# Patient Record
Sex: MALE | Race: WHITE | NOT HISPANIC OR LATINO | Employment: OTHER | URBAN - METROPOLITAN AREA
[De-identification: names, ages, dates, MRNs, and addresses within clinical notes are randomized per-mention and may not be internally consistent; named-entity substitution may affect disease eponyms.]

---

## 2017-06-29 ENCOUNTER — RESULTS ENCOUNTER (OUTPATIENT)
Dept: INTERNAL MEDICINE | Facility: CLINIC | Age: 72
End: 2017-06-29

## 2017-06-29 DIAGNOSIS — E78.49 OTHER HYPERLIPIDEMIA: Primary | ICD-10-CM

## 2017-06-29 DIAGNOSIS — R73.09 ABNORMAL GLUCOSE TOLERANCE TEST: ICD-10-CM

## 2017-06-29 DIAGNOSIS — Z00.00 ROUTINE GENERAL MEDICAL EXAMINATION AT A HEALTH CARE FACILITY: ICD-10-CM

## 2017-06-29 DIAGNOSIS — E78.49 OTHER HYPERLIPIDEMIA: ICD-10-CM

## 2017-06-29 DIAGNOSIS — M51.37 DDD (DEGENERATIVE DISC DISEASE), LUMBOSACRAL: ICD-10-CM

## 2017-06-29 DIAGNOSIS — N40.0 BENIGN FIBROMA OF PROSTATE: ICD-10-CM

## 2017-06-29 DIAGNOSIS — I10 ESSENTIAL HYPERTENSION: ICD-10-CM

## 2017-06-30 LAB
ALBUMIN SERPL-MCNC: 4.7 G/DL (ref 3.5–5.2)
ALBUMIN/GLOB SERPL: 1.7 G/DL
ALP SERPL-CCNC: 78 U/L (ref 39–117)
ALT SERPL-CCNC: 14 U/L (ref 1–41)
AST SERPL-CCNC: 18 U/L (ref 1–40)
BASOPHILS # BLD AUTO: 0.06 10*3/MM3 (ref 0–0.2)
BASOPHILS NFR BLD AUTO: 1 % (ref 0–1.5)
BILIRUB SERPL-MCNC: 0.2 MG/DL (ref 0.1–1.2)
BUN SERPL-MCNC: 20 MG/DL (ref 8–23)
BUN/CREAT SERPL: 18.3 (ref 7–25)
CALCIUM SERPL-MCNC: 9.8 MG/DL (ref 8.6–10.5)
CHLORIDE SERPL-SCNC: 99 MMOL/L (ref 98–107)
CHOLEST SERPL-MCNC: 189 MG/DL (ref 0–200)
CO2 SERPL-SCNC: 25.2 MMOL/L (ref 22–29)
CREAT SERPL-MCNC: 1.09 MG/DL (ref 0.76–1.27)
EOSINOPHIL # BLD AUTO: 0.15 10*3/MM3 (ref 0–0.7)
EOSINOPHIL NFR BLD AUTO: 2.6 % (ref 0.3–6.2)
ERYTHROCYTE [DISTWIDTH] IN BLOOD BY AUTOMATED COUNT: 13.2 % (ref 11.5–14.5)
GLOBULIN SER CALC-MCNC: 2.8 GM/DL
GLUCOSE SERPL-MCNC: 88 MG/DL (ref 65–99)
HCT VFR BLD AUTO: 41.1 % (ref 40.4–52.2)
HDLC SERPL-MCNC: 54 MG/DL (ref 40–60)
HGB BLD-MCNC: 14 G/DL (ref 13.7–17.6)
IMM GRANULOCYTES # BLD: 0.03 10*3/MM3 (ref 0–0.03)
IMM GRANULOCYTES NFR BLD: 0.5 % (ref 0–0.5)
LDLC SERPL CALC-MCNC: 119 MG/DL (ref 0–100)
LYMPHOCYTES # BLD AUTO: 2.38 10*3/MM3 (ref 0.9–4.8)
LYMPHOCYTES NFR BLD AUTO: 41.2 % (ref 19.6–45.3)
MCH RBC QN AUTO: 32.7 PG (ref 27–32.7)
MCHC RBC AUTO-ENTMCNC: 34.1 G/DL (ref 32.6–36.4)
MCV RBC AUTO: 96 FL (ref 79.8–96.2)
MONOCYTES # BLD AUTO: 0.47 10*3/MM3 (ref 0.2–1.2)
MONOCYTES NFR BLD AUTO: 8.1 % (ref 5–12)
NEUTROPHILS # BLD AUTO: 2.68 10*3/MM3 (ref 1.9–8.1)
NEUTROPHILS NFR BLD AUTO: 46.6 % (ref 42.7–76)
PLATELET # BLD AUTO: 313 10*3/MM3 (ref 140–500)
POTASSIUM SERPL-SCNC: 4.9 MMOL/L (ref 3.5–5.2)
PROT SERPL-MCNC: 7.5 G/DL (ref 6–8.5)
RBC # BLD AUTO: 4.28 10*6/MM3 (ref 4.6–6)
SODIUM SERPL-SCNC: 138 MMOL/L (ref 136–145)
T4 FREE SERPL-MCNC: 1.17 NG/DL (ref 0.93–1.7)
TRIGL SERPL-MCNC: 80 MG/DL (ref 0–150)
TSH SERPL DL<=0.005 MIU/L-ACNC: 4.13 MIU/ML (ref 0.27–4.2)
UNABLE TO VOID: NORMAL
VLDLC SERPL CALC-MCNC: 16 MG/DL (ref 5–40)
WBC # BLD AUTO: 5.77 10*3/MM3 (ref 4.5–10.7)

## 2017-07-04 ENCOUNTER — RESULTS ENCOUNTER (OUTPATIENT)
Dept: INTERNAL MEDICINE | Facility: CLINIC | Age: 72
End: 2017-07-04

## 2017-07-04 DIAGNOSIS — I10 ESSENTIAL HYPERTENSION: ICD-10-CM

## 2017-07-04 DIAGNOSIS — E78.49 OTHER HYPERLIPIDEMIA: ICD-10-CM

## 2017-07-04 DIAGNOSIS — R73.09 ABNORMAL GLUCOSE TOLERANCE TEST: ICD-10-CM

## 2017-07-04 DIAGNOSIS — M51.37 DDD (DEGENERATIVE DISC DISEASE), LUMBOSACRAL: ICD-10-CM

## 2017-07-04 DIAGNOSIS — Z00.00 ROUTINE GENERAL MEDICAL EXAMINATION AT A HEALTH CARE FACILITY: ICD-10-CM

## 2017-07-04 DIAGNOSIS — N40.0 BENIGN FIBROMA OF PROSTATE: ICD-10-CM

## 2017-07-05 RX ORDER — AMLODIPINE BESYLATE AND BENAZEPRIL HYDROCHLORIDE 10; 20 MG/1; MG/1
1 CAPSULE ORAL DAILY
Qty: 90 CAPSULE | Refills: 1 | Status: SHIPPED | OUTPATIENT
Start: 2017-07-05 | End: 2018-01-26

## 2017-07-05 RX ORDER — AMLODIPINE BESYLATE AND BENAZEPRIL HYDROCHLORIDE 10; 20 MG/1; MG/1
CAPSULE ORAL
Qty: 90 CAPSULE | Refills: 1 | Status: SHIPPED | OUTPATIENT
Start: 2017-07-05 | End: 2017-07-05 | Stop reason: SDUPTHER

## 2018-01-05 RX ORDER — AMLODIPINE BESYLATE AND BENAZEPRIL HYDROCHLORIDE 10; 20 MG/1; MG/1
CAPSULE ORAL
Qty: 90 CAPSULE | Refills: 0 | Status: SHIPPED | OUTPATIENT
Start: 2018-01-05 | End: 2018-09-24 | Stop reason: SDUPTHER

## 2018-01-26 ENCOUNTER — OFFICE VISIT (OUTPATIENT)
Dept: INTERNAL MEDICINE | Facility: CLINIC | Age: 73
End: 2018-01-26

## 2018-01-26 VITALS
OXYGEN SATURATION: 97 % | DIASTOLIC BLOOD PRESSURE: 72 MMHG | TEMPERATURE: 97.5 F | BODY MASS INDEX: 25.17 KG/M2 | HEIGHT: 72 IN | WEIGHT: 185.8 LBS | HEART RATE: 93 BPM | SYSTOLIC BLOOD PRESSURE: 130 MMHG

## 2018-01-26 DIAGNOSIS — I10 ESSENTIAL HYPERTENSION: Primary | ICD-10-CM

## 2018-01-26 PROCEDURE — 99212 OFFICE O/P EST SF 10 MIN: CPT | Performed by: INTERNAL MEDICINE

## 2018-02-08 NOTE — PROGRESS NOTES
Subjective   Chacorta Langston is a 72 y.o. male.   He is here today follow-up for hypertension which is stable  History of Present Illness   He is here today to follow-up for hypertension which is stable and he has no new complaints with current medication  The following portions of the patient's history were reviewed and updated as appropriate: allergies, current medications, past family history, past medical history, past social history, past surgical history and problem list.    Review of Systems   All other systems reviewed and are negative.      Objective   Physical Exam   Constitutional: He is oriented to person, place, and time. He appears well-developed and well-nourished. He is cooperative.   HENT:   Head: Normocephalic and atraumatic.   Right Ear: Hearing, tympanic membrane, external ear and ear canal normal.   Left Ear: Hearing, tympanic membrane, external ear and ear canal normal.   Nose: Nose normal.   Mouth/Throat: Uvula is midline, oropharynx is clear and moist and mucous membranes are normal.   Eyes: Conjunctivae, EOM and lids are normal. Pupils are equal, round, and reactive to light.   Neck: Phonation normal. Neck supple. Carotid bruit is not present.   Cardiovascular: Normal rate, regular rhythm and normal heart sounds.  Exam reveals no gallop and no friction rub.    No murmur heard.  Pulmonary/Chest: Effort normal and breath sounds normal. No respiratory distress.   Abdominal: Soft. Bowel sounds are normal. He exhibits no distension and no mass. There is no hepatosplenomegaly. There is no tenderness. There is no rebound and no guarding. No hernia.   Musculoskeletal: He exhibits no edema.   Neurological: He is alert and oriented to person, place, and time. Coordination and gait normal.   Skin: Skin is warm and dry.   Psychiatric: He has a normal mood and affect. His speech is normal and behavior is normal. Judgment and thought content normal.   Nursing note and vitals  reviewed.      Assessment/Plan   Diagnoses and all orders for this visit:    Essential hypertension      Hypertension stable on current medication no changes needed and we will continue with baby aspirin as well

## 2018-02-10 RX ORDER — PROMETHAZINE HYDROCHLORIDE 25 MG/1
TABLET ORAL
Qty: 50 TABLET | Refills: 0 | Status: SHIPPED | OUTPATIENT
Start: 2018-02-10 | End: 2018-12-11

## 2018-09-24 RX ORDER — AMLODIPINE BESYLATE AND BENAZEPRIL HYDROCHLORIDE 10; 20 MG/1; MG/1
CAPSULE ORAL
Qty: 90 CAPSULE | Refills: 0 | Status: SHIPPED | OUTPATIENT
Start: 2018-09-24 | End: 2018-12-20 | Stop reason: SDUPTHER

## 2018-12-05 ENCOUNTER — OFFICE (OUTPATIENT)
Dept: URBAN - METROPOLITAN AREA CLINIC 66 | Facility: CLINIC | Age: 73
End: 2018-12-05

## 2018-12-05 VITALS
DIASTOLIC BLOOD PRESSURE: 66 MMHG | HEIGHT: 72 IN | HEART RATE: 80 BPM | SYSTOLIC BLOOD PRESSURE: 108 MMHG | WEIGHT: 187 LBS

## 2018-12-05 DIAGNOSIS — R19.5 OTHER FECAL ABNORMALITIES: ICD-10-CM

## 2018-12-05 DIAGNOSIS — K92.1 MELENA: ICD-10-CM

## 2018-12-05 PROCEDURE — 99213 OFFICE O/P EST LOW 20 MIN: CPT | Performed by: INTERNAL MEDICINE

## 2018-12-05 RX ORDER — ESOMEPRAZOLE 20 MG/1
40 CAPSULE, DELAYED RELEASE ORAL
Qty: 60 | Refills: 12 | Status: ACTIVE
Start: 2018-12-05

## 2018-12-07 ENCOUNTER — ON CAMPUS - OUTPATIENT (OUTPATIENT)
Dept: URBAN - METROPOLITAN AREA HOSPITAL 114 | Facility: HOSPITAL | Age: 73
End: 2018-12-07
Payer: COMMERCIAL

## 2018-12-07 ENCOUNTER — ANESTHESIA EVENT (OUTPATIENT)
Dept: GASTROENTEROLOGY | Facility: HOSPITAL | Age: 73
End: 2018-12-07

## 2018-12-07 ENCOUNTER — ANESTHESIA (OUTPATIENT)
Dept: GASTROENTEROLOGY | Facility: HOSPITAL | Age: 73
End: 2018-12-07

## 2018-12-07 ENCOUNTER — HOSPITAL ENCOUNTER (OUTPATIENT)
Facility: HOSPITAL | Age: 73
Setting detail: HOSPITAL OUTPATIENT SURGERY
Discharge: HOME | End: 2018-12-07
Attending: INTERNAL MEDICINE | Admitting: INTERNAL MEDICINE

## 2018-12-07 VITALS
DIASTOLIC BLOOD PRESSURE: 70 MMHG | WEIGHT: 185.06 LBS | SYSTOLIC BLOOD PRESSURE: 124 MMHG | RESPIRATION RATE: 16 BRPM | HEART RATE: 78 BPM | TEMPERATURE: 97.9 F | BODY MASS INDEX: 25.09 KG/M2 | OXYGEN SATURATION: 98 %

## 2018-12-07 DIAGNOSIS — K92.1 MELENA: ICD-10-CM

## 2018-12-07 DIAGNOSIS — D62 ACUTE POSTHEMORRHAGIC ANEMIA: ICD-10-CM

## 2018-12-07 DIAGNOSIS — K29.50 UNSPECIFIED CHRONIC GASTRITIS WITHOUT BLEEDING: ICD-10-CM

## 2018-12-07 DIAGNOSIS — K25.4 CHRONIC OR UNSPECIFIED GASTRIC ULCER WITH HEMORRHAGE: ICD-10-CM

## 2018-12-07 DIAGNOSIS — K22.2 ESOPHAGEAL OBSTRUCTION: ICD-10-CM

## 2018-12-07 DIAGNOSIS — K44.9 DIAPHRAGMATIC HERNIA WITHOUT OBSTRUCTION OR GANGRENE: ICD-10-CM

## 2018-12-07 DIAGNOSIS — R19.5 HEME POSITIVE STOOL: ICD-10-CM

## 2018-12-07 DIAGNOSIS — D64.9 ANEMIA: ICD-10-CM

## 2018-12-07 DIAGNOSIS — R19.5 OTHER FECAL ABNORMALITIES: ICD-10-CM

## 2018-12-07 PROCEDURE — 43239 EGD BIOPSY SINGLE/MULTIPLE: CPT | Mod: 59 | Performed by: INTERNAL MEDICINE

## 2018-12-07 PROCEDURE — 25010000002 PROPOFOL 10 MG/ML EMULSION: Performed by: ANESTHESIOLOGY

## 2018-12-07 PROCEDURE — 25010000002 PHENYLEPHRINE PER 1 ML: Performed by: ANESTHESIOLOGY

## 2018-12-07 PROCEDURE — 43255 EGD CONTROL BLEEDING ANY: CPT | Performed by: INTERNAL MEDICINE

## 2018-12-07 PROCEDURE — 88305 TISSUE EXAM BY PATHOLOGIST: CPT | Performed by: INTERNAL MEDICINE

## 2018-12-07 DEVICE — DEV CLIP ENDO RESOLUTION360 CONTRL ROT 235CM: Type: IMPLANTABLE DEVICE | Site: STOMACH | Status: FUNCTIONAL

## 2018-12-07 RX ORDER — PROPOFOL 10 MG/ML
VIAL (ML) INTRAVENOUS CONTINUOUS PRN
Status: DISCONTINUED | OUTPATIENT
Start: 2018-12-07 | End: 2018-12-07 | Stop reason: SURG

## 2018-12-07 RX ORDER — SODIUM CHLORIDE, SODIUM LACTATE, POTASSIUM CHLORIDE, CALCIUM CHLORIDE 600; 310; 30; 20 MG/100ML; MG/100ML; MG/100ML; MG/100ML
1000 INJECTION, SOLUTION INTRAVENOUS CONTINUOUS
Status: DISCONTINUED | OUTPATIENT
Start: 2018-12-07 | End: 2018-12-07 | Stop reason: HOSPADM

## 2018-12-07 RX ORDER — PROPOFOL 10 MG/ML
VIAL (ML) INTRAVENOUS AS NEEDED
Status: DISCONTINUED | OUTPATIENT
Start: 2018-12-07 | End: 2018-12-07 | Stop reason: SURG

## 2018-12-07 RX ORDER — LIDOCAINE HYDROCHLORIDE 20 MG/ML
INJECTION, SOLUTION INFILTRATION; PERINEURAL AS NEEDED
Status: DISCONTINUED | OUTPATIENT
Start: 2018-12-07 | End: 2018-12-07 | Stop reason: SURG

## 2018-12-07 RX ADMIN — SODIUM CHLORIDE, POTASSIUM CHLORIDE, SODIUM LACTATE AND CALCIUM CHLORIDE 1000 ML: 600; 310; 30; 20 INJECTION, SOLUTION INTRAVENOUS at 10:57

## 2018-12-07 RX ADMIN — PROPOFOL 100 MCG/KG/MIN: 10 INJECTION, EMULSION INTRAVENOUS at 11:09

## 2018-12-07 RX ADMIN — PHENYLEPHRINE HYDROCHLORIDE 100 MCG: 10 INJECTION INTRAVENOUS at 11:27

## 2018-12-07 RX ADMIN — PROPOFOL 100 MG: 10 INJECTION, EMULSION INTRAVENOUS at 11:11

## 2018-12-07 RX ADMIN — PHENYLEPHRINE HYDROCHLORIDE 200 MCG: 10 INJECTION INTRAVENOUS at 11:40

## 2018-12-07 RX ADMIN — EPHEDRINE SULFATE 25 MG: 50 INJECTION INTRAMUSCULAR; INTRAVENOUS; SUBCUTANEOUS at 11:18

## 2018-12-07 RX ADMIN — LIDOCAINE HYDROCHLORIDE 100 MG: 20 INJECTION, SOLUTION INFILTRATION; PERINEURAL at 11:11

## 2018-12-07 RX ADMIN — SODIUM CHLORIDE, POTASSIUM CHLORIDE, SODIUM LACTATE AND CALCIUM CHLORIDE: 600; 310; 30; 20 INJECTION, SOLUTION INTRAVENOUS at 11:05

## 2018-12-07 NOTE — ANESTHESIA POSTPROCEDURE EVALUATION
Patient: Chacorta Langston    Procedure Summary     Date:  12/07/18 Room / Location:   [facility]    Anesthesia Start:  1105 Anesthesia Stop:  1145    Procedures:       ESOPHAGOGASTRODUODENOSCOPY with biopsies, resolution clip X 1 placed (N/A Esophagus)      COLONOSCOPY to cecum and terminal ileum (N/A ) Diagnosis:      Surgeon:  Maxx Tavarez MD Provider:  Tramaine Verma MD    Anesthesia Type:  MAC ASA Status:  2          Anesthesia Type: MAC  Last vitals  BP   106/53 (12/07/18 1045)   Temp   36.8 °C (98.3 °F) (12/07/18 1045)   Pulse   74 (12/07/18 1045)   Resp   18 (12/07/18 1045)     SpO2   98 % (12/07/18 1045)     Post Anesthesia Care and Evaluation    Patient location during evaluation: bedside  Patient participation: complete - patient participated  Level of consciousness: sleepy but conscious  Pain score: 0  Pain management: adequate  Airway patency: patent  Anesthetic complications: No anesthetic complications    Cardiovascular status: acceptable  Respiratory status: acceptable  Hydration status: acceptable    Comments: /53 (BP Location: Left arm, Patient Position: Lying)   Pulse 74   Temp 36.8 °C (98.3 °F) (Oral)   Resp 18   Wt 83.9 kg (185 lb 1 oz)   SpO2 98%   BMI 25.09 kg/m²

## 2018-12-07 NOTE — H&P
Patient Care Team:  Vikram Grace MD as PCP - General (Internal Medicine)  Vikram Grace MD as PCP - Claims Attributed    Chief complaint melena    Subjective     History of Present Illness  Recently presented with black stools  Found to have black heme + stool in the office and a large prostate,   hgb 11.8 concern over ulcer disease   Review of Systems   Gastrointestinal: Positive for blood in stool.        Past Medical History:   Diagnosis Date   • Hypertension      Past Surgical History:   Procedure Laterality Date   • COLONOSCOPY       Family History   Problem Relation Age of Onset   • COPD Other    • Heart failure Other    • Diabetes Other    • Stroke Other    • Hypertension Other      Social History     Tobacco Use   • Smoking status: Never Smoker   Substance Use Topics   • Alcohol use: Yes   • Drug use: Not on file     Medications Prior to Admission   Medication Sig Dispense Refill Last Dose   • amLODIPine-benazepril (LOTREL) 10-20 MG per capsule TAKE ONE CAPSULE BY MOUTH DAILY 90 capsule 0 12/7/2018 at Unknown time   • Biotin 5000 MCG tablet Take  by mouth.   Past Week at Unknown time   • calcium carbonate-vitamin d 600-400 MG-UNIT per tablet Take  by mouth.   Past Week at Unknown time   • desonide (DESOWEN) 0.05 % ointment Apply  topically 2 (two) times a day.   Past Week at Unknown time   • Flaxseed, Linseed, (FLAX SEEDS PO) Take  by mouth.   Past Week at Unknown time   • saw palmetto 160 MG capsule Take  by mouth 2 (two) times a day.   Past Week at Unknown time   • vitamin C (ASCORBIC ACID) 250 MG tablet Take  by mouth daily.   Past Week at Unknown time   • aspirin 81 MG tablet Take  by mouth daily.   12/4/2018   • promethazine (PHENERGAN) 25 MG tablet Take 1 or 2 tabs christina 4 hours as needed 50 tablet 0      Allergies:  Beta adrenergic blockers and Penicillins    Objective      Vital Signs  Temp:  [98.3 °F (36.8 °C)] 98.3 °F (36.8 °C)  Heart Rate:  [74] 74  Resp:  [18] 18  BP:  (106)/(53) 106/53    Physical Exam   Constitutional: He is oriented to person, place, and time. He appears well-developed and well-nourished.   HENT:   Head: Normocephalic.   Eyes: Conjunctivae are normal.   Neck: Normal range of motion. Neck supple.   Cardiovascular: Normal rate and regular rhythm.   Pulmonary/Chest: Effort normal and breath sounds normal.   Abdominal: Soft. Bowel sounds are normal.   Neurological: He is alert and oriented to person, place, and time.   Skin: Skin is warm and dry.   Psychiatric: He has a normal mood and affect.       Results Review:   I reviewed the patient's new clinical results.  I reviewed the patient's other test results and agree with the interpretation      Assessment/Plan       * No active hospital problems. *      Assessment:  (Melena  Heme + stools  Mild anemia).     Plan:   (Upper and lower tract endoscopy, risks, alternatives and benefits dicussed  with patient and patient is agreeable to proceed.).       I discussed the patients findings and my recommendations with patient and nursing staff    Maxx Tavarez MD  12/07/18  11:06 AM

## 2018-12-07 NOTE — ANESTHESIA PREPROCEDURE EVALUATION
Anesthesia Evaluation     Patient summary reviewed and Nursing notes reviewed   NPO Solid Status: > 8 hours  NPO Liquid Status: > 8 hours           Airway   Mallampati: II  Neck ROM: full  No difficulty expected  Dental - normal exam     Pulmonary     breath sounds clear to auscultation  Cardiovascular     Rhythm: regular    (+) hypertension, hyperlipidemia,       Neuro/Psych  GI/Hepatic/Renal/Endo      Musculoskeletal     Abdominal    Substance History      OB/GYN          Other   (+) arthritis   history of cancer                    Anesthesia Plan    ASA 2     MAC     intravenous induction   Anesthetic plan, all risks, benefits, and alternatives have been provided, discussed and informed consent has been obtained with: patient.

## 2018-12-10 LAB
CYTO UR: NORMAL
LAB AP CASE REPORT: NORMAL
PATH REPORT.FINAL DX SPEC: NORMAL
PATH REPORT.GROSS SPEC: NORMAL

## 2018-12-11 ENCOUNTER — OFFICE VISIT (OUTPATIENT)
Dept: INTERNAL MEDICINE | Facility: CLINIC | Age: 73
End: 2018-12-11

## 2018-12-11 VITALS
TEMPERATURE: 97.8 F | DIASTOLIC BLOOD PRESSURE: 77 MMHG | SYSTOLIC BLOOD PRESSURE: 143 MMHG | WEIGHT: 186 LBS | HEART RATE: 82 BPM | RESPIRATION RATE: 17 BRPM | OXYGEN SATURATION: 98 % | BODY MASS INDEX: 25.19 KG/M2 | HEIGHT: 72 IN

## 2018-12-11 DIAGNOSIS — I10 ESSENTIAL HYPERTENSION: ICD-10-CM

## 2018-12-11 DIAGNOSIS — R73.09 ABNORMAL GLUCOSE TOLERANCE TEST: ICD-10-CM

## 2018-12-11 DIAGNOSIS — K22.2 SCHATZKI'S RING OF DISTAL ESOPHAGUS: ICD-10-CM

## 2018-12-11 DIAGNOSIS — K92.2 UGI BLEED: Primary | ICD-10-CM

## 2018-12-11 DIAGNOSIS — R35.1 NOCTURIA: ICD-10-CM

## 2018-12-11 DIAGNOSIS — K25.0 ACUTE GASTRIC ULCER WITH HEMORRHAGE: ICD-10-CM

## 2018-12-11 DIAGNOSIS — D62 ACUTE BLOOD LOSS ANEMIA: ICD-10-CM

## 2018-12-11 DIAGNOSIS — E78.2 MIXED HYPERLIPIDEMIA: ICD-10-CM

## 2018-12-11 LAB
ALBUMIN SERPL-MCNC: 4.8 G/DL (ref 3.5–5.2)
ALBUMIN/GLOB SERPL: 1.8 G/DL
ALP SERPL-CCNC: 72 U/L (ref 39–117)
ALT SERPL-CCNC: 12 U/L (ref 1–41)
APPEARANCE UR: CLEAR
AST SERPL-CCNC: 15 U/L (ref 1–40)
BACTERIA #/AREA URNS HPF: NORMAL /HPF
BASOPHILS # BLD AUTO: 0.03 10*3/MM3 (ref 0–0.2)
BASOPHILS NFR BLD AUTO: 0.4 % (ref 0–1.5)
BILIRUB SERPL-MCNC: 0.2 MG/DL (ref 0.1–1.2)
BILIRUB UR QL STRIP: NEGATIVE
BUN SERPL-MCNC: 23 MG/DL (ref 8–23)
BUN/CREAT SERPL: 18.9 (ref 7–25)
CALCIUM SERPL-MCNC: 9.9 MG/DL (ref 8.6–10.5)
CASTS URNS MICRO: NORMAL
CHLORIDE SERPL-SCNC: 102 MMOL/L (ref 98–107)
CHOLEST SERPL-MCNC: 186 MG/DL (ref 0–200)
CO2 SERPL-SCNC: 25.2 MMOL/L (ref 22–29)
COLOR UR: YELLOW
CREAT SERPL-MCNC: 1.22 MG/DL (ref 0.76–1.27)
EOSINOPHIL # BLD AUTO: 0.19 10*3/MM3 (ref 0–0.7)
EOSINOPHIL NFR BLD AUTO: 2.4 % (ref 0.3–6.2)
EPI CELLS #/AREA URNS HPF: NORMAL /HPF
ERYTHROCYTE [DISTWIDTH] IN BLOOD BY AUTOMATED COUNT: 13.5 % (ref 11.5–14.5)
FERRITIN SERPL-MCNC: 114.8 NG/ML (ref 30–400)
GLOBULIN SER CALC-MCNC: 2.6 GM/DL
GLUCOSE SERPL-MCNC: 107 MG/DL (ref 65–99)
GLUCOSE UR QL: NEGATIVE
HBA1C MFR BLD: 5.4 % (ref 4.8–5.6)
HCT VFR BLD AUTO: 29.2 % (ref 40.4–52.2)
HDLC SERPL-MCNC: 46 MG/DL (ref 40–60)
HGB BLD-MCNC: 9.8 G/DL (ref 13.7–17.6)
HGB UR QL STRIP: NEGATIVE
IMM GRANULOCYTES # BLD: 0.06 10*3/MM3 (ref 0–0.03)
IMM GRANULOCYTES NFR BLD: 0.7 % (ref 0–0.5)
IRON SATN MFR SERPL: 10 % (ref 20–50)
IRON SERPL-MCNC: 43 MCG/DL (ref 59–158)
KETONES UR QL STRIP: NEGATIVE
LDLC SERPL CALC-MCNC: 115 MG/DL (ref 0–100)
LDLC/HDLC SERPL: 2.5 {RATIO}
LEUKOCYTE ESTERASE UR QL STRIP: (no result)
LYMPHOCYTES # BLD AUTO: 2.05 10*3/MM3 (ref 0.9–4.8)
LYMPHOCYTES NFR BLD AUTO: 25.5 % (ref 19.6–45.3)
MCH RBC QN AUTO: 32.3 PG (ref 27–32.7)
MCHC RBC AUTO-ENTMCNC: 33.6 G/DL (ref 32.6–36.4)
MCV RBC AUTO: 96.4 FL (ref 79.8–96.2)
MONOCYTES # BLD AUTO: 0.51 10*3/MM3 (ref 0.2–1.2)
MONOCYTES NFR BLD AUTO: 6.3 % (ref 5–12)
NEUTROPHILS # BLD AUTO: 5.27 10*3/MM3 (ref 1.9–8.1)
NEUTROPHILS NFR BLD AUTO: 65.4 % (ref 42.7–76)
NITRITE UR QL STRIP: NEGATIVE
PH UR STRIP: 5.5 [PH] (ref 5–8)
PLATELET # BLD AUTO: 355 10*3/MM3 (ref 140–500)
POTASSIUM SERPL-SCNC: 5.1 MMOL/L (ref 3.5–5.2)
PROT SERPL-MCNC: 7.4 G/DL (ref 6–8.5)
PROT UR QL STRIP: NEGATIVE
PSA SERPL-MCNC: 6.24 NG/ML (ref 0–4)
RBC # BLD AUTO: 3.03 10*6/MM3 (ref 4.6–6)
RBC #/AREA URNS HPF: NORMAL /HPF
SODIUM SERPL-SCNC: 139 MMOL/L (ref 136–145)
SP GR UR: 1.02 (ref 1–1.03)
T4 FREE SERPL-MCNC: 1.13 NG/DL (ref 0.93–1.7)
TIBC SERPL-MCNC: 425 MCG/DL
TRIGL SERPL-MCNC: 126 MG/DL (ref 0–150)
TSH SERPL DL<=0.005 MIU/L-ACNC: 3.04 MIU/ML (ref 0.27–4.2)
UIBC SERPL-MCNC: 382 MCG/DL
UROBILINOGEN UR STRIP-MCNC: (no result) MG/DL
VLDLC SERPL CALC-MCNC: 25.2 MG/DL (ref 5–40)
WBC # BLD AUTO: 8.05 10*3/MM3 (ref 4.5–10.7)
WBC #/AREA URNS HPF: NORMAL /HPF

## 2018-12-11 PROCEDURE — 99214 OFFICE O/P EST MOD 30 MIN: CPT | Performed by: INTERNAL MEDICINE

## 2018-12-20 RX ORDER — AMLODIPINE BESYLATE AND BENAZEPRIL HYDROCHLORIDE 10; 20 MG/1; MG/1
CAPSULE ORAL
Qty: 90 CAPSULE | Refills: 2 | Status: SHIPPED | OUTPATIENT
Start: 2018-12-20 | End: 2019-10-08 | Stop reason: SDUPTHER

## 2018-12-25 NOTE — PROGRESS NOTES
Subjective   Chacorta Langston is a 73 y.o. male.   Is here today for upper GI bleed recently along with acute gastric ulcer with hemorrhage which was discovered along with Schatzki's ring of distal esophagus and acute blood loss anemia and hypertension and mixed hyper lipidemia and abnormal glucose tolerance test and nocturia  History of Present Illness   He is here today for follow-up for upper GI bleed recently along with acute gastric ulcer with hemorrhage recently and also Schatzki's ring of distal esophagus which was discovered on EGD and acute blood loss anemia and all these problems are getting better along with hypertension which is stable on current medication mixed hyper lipidemia which is stable on current medication and abnormal glucose tolerance test which has been stable with no evidence of diabetes and nocturia as well which he tolerates  The following portions of the patient's history were reviewed and updated as appropriate: allergies, current medications, past family history, past medical history, past social history, past surgical history and problem list.    Review of Systems   Constitutional: Negative for fatigue.   Gastrointestinal:        Recent upper GI bleed with ulcer and Schatzki's ring which was discovered on EGD   Endocrine: Negative for cold intolerance, polydipsia and polyuria.   Neurological: Negative for weakness and light-headedness.   Psychiatric/Behavioral: Negative for dysphoric mood.   All other systems reviewed and are negative.      Objective   Physical Exam   Constitutional: He is oriented to person, place, and time. He appears well-developed and well-nourished. He is cooperative.   HENT:   Head: Normocephalic and atraumatic.   Right Ear: Hearing, tympanic membrane, external ear and ear canal normal.   Left Ear: Hearing, tympanic membrane, external ear and ear canal normal.   Nose: Nose normal.   Mouth/Throat: Uvula is midline, oropharynx is clear and moist and mucous  membranes are normal.   Eyes: Conjunctivae, EOM and lids are normal. Pupils are equal, round, and reactive to light.   Neck: Phonation normal. Neck supple. Carotid bruit is not present.   Cardiovascular: Normal rate, regular rhythm and normal heart sounds. Exam reveals no gallop and no friction rub.   No murmur heard.  Pulmonary/Chest: Effort normal and breath sounds normal. No respiratory distress.   Abdominal: Soft. Bowel sounds are normal. He exhibits no distension and no mass. There is no hepatosplenomegaly. There is no tenderness. There is no rebound and no guarding. No hernia.   Musculoskeletal: He exhibits no edema.   Neurological: He is alert and oriented to person, place, and time. Coordination and gait normal.   Skin: Skin is warm and dry.   Psychiatric: He has a normal mood and affect. His speech is normal and behavior is normal. Judgment and thought content normal.   Nursing note and vitals reviewed.      Assessment/Plan   Diagnoses and all orders for this visit:    UGI bleed    Acute gastric ulcer with hemorrhage    Schatzki's ring of distal esophagus    Acute blood loss anemia  -     Iron and TIBC  -     Ferritin    Essential hypertension  -     CBC & Differential  -     Comprehensive Metabolic Panel  -     Urinalysis With Microscopic - Urine, Clean Catch    Mixed hyperlipidemia  -     Lipid Panel With LDL / HDL Ratio  -     T4, Free  -     TSH    Abnormal glucose tolerance test  -     Hemoglobin A1c    Nocturia  -     Cancel: PSA DIAGNOSTIC    Other orders  -     Microscopic Examination -  -     PSA DIAGNOSTIC      Upper GI bleed much more stable now after intervention by GI and he is doing better  Acute gastric ulcer with hemorrhage much more stable now but he still has blood loss anemia from this  Acute blood loss anemia has been stable and we will check iron and ferritin  Hypertension stable on current medication no changes  Mixed hyperlipidemia has been stable on current medication we will check  lipid panel with liver enzymes  Abnormal glucose tolerance test has been stable with no evidence of diabetes we will check that today  Nocturia and he tolerates this time with no changes needed

## 2019-03-08 ENCOUNTER — ANESTHESIA (OUTPATIENT)
Dept: GASTROENTEROLOGY | Facility: HOSPITAL | Age: 74
End: 2019-03-08

## 2019-03-08 ENCOUNTER — ON CAMPUS - OUTPATIENT (OUTPATIENT)
Dept: URBAN - METROPOLITAN AREA HOSPITAL 114 | Facility: HOSPITAL | Age: 74
End: 2019-03-08

## 2019-03-08 ENCOUNTER — ANESTHESIA EVENT (OUTPATIENT)
Dept: GASTROENTEROLOGY | Facility: HOSPITAL | Age: 74
End: 2019-03-08

## 2019-03-08 ENCOUNTER — HOSPITAL ENCOUNTER (OUTPATIENT)
Facility: HOSPITAL | Age: 74
Setting detail: HOSPITAL OUTPATIENT SURGERY
Discharge: HOME | End: 2019-03-08
Attending: INTERNAL MEDICINE | Admitting: INTERNAL MEDICINE

## 2019-03-08 VITALS
WEIGHT: 180 LBS | TEMPERATURE: 98.5 F | BODY MASS INDEX: 24.38 KG/M2 | HEART RATE: 59 BPM | OXYGEN SATURATION: 98 % | RESPIRATION RATE: 18 BRPM | DIASTOLIC BLOOD PRESSURE: 64 MMHG | HEIGHT: 72 IN | SYSTOLIC BLOOD PRESSURE: 106 MMHG

## 2019-03-08 DIAGNOSIS — K44.9 DIAPHRAGMATIC HERNIA WITHOUT OBSTRUCTION OR GANGRENE: ICD-10-CM

## 2019-03-08 DIAGNOSIS — K25.9 GASTRIC ULCER, UNSPECIFIED AS ACUTE OR CHRONIC, WITHOUT HEMO: ICD-10-CM

## 2019-03-08 PROCEDURE — 25010000002 PROPOFOL 10 MG/ML EMULSION: Performed by: ANESTHESIOLOGY

## 2019-03-08 PROCEDURE — 43235 EGD DIAGNOSTIC BRUSH WASH: CPT | Performed by: INTERNAL MEDICINE

## 2019-03-08 RX ORDER — PROPOFOL 10 MG/ML
VIAL (ML) INTRAVENOUS CONTINUOUS PRN
Status: DISCONTINUED | OUTPATIENT
Start: 2019-03-08 | End: 2019-03-08 | Stop reason: SURG

## 2019-03-08 RX ORDER — SODIUM CHLORIDE, SODIUM LACTATE, POTASSIUM CHLORIDE, CALCIUM CHLORIDE 600; 310; 30; 20 MG/100ML; MG/100ML; MG/100ML; MG/100ML
30 INJECTION, SOLUTION INTRAVENOUS CONTINUOUS PRN
Status: DISCONTINUED | OUTPATIENT
Start: 2019-03-08 | End: 2019-03-08 | Stop reason: HOSPADM

## 2019-03-08 RX ORDER — PROPOFOL 10 MG/ML
VIAL (ML) INTRAVENOUS AS NEEDED
Status: DISCONTINUED | OUTPATIENT
Start: 2019-03-08 | End: 2019-03-08 | Stop reason: SURG

## 2019-03-08 RX ORDER — LIDOCAINE HYDROCHLORIDE 20 MG/ML
INJECTION, SOLUTION INFILTRATION; PERINEURAL AS NEEDED
Status: DISCONTINUED | OUTPATIENT
Start: 2019-03-08 | End: 2019-03-08 | Stop reason: SURG

## 2019-03-08 RX ADMIN — PROPOFOL 100 MG: 10 INJECTION, EMULSION INTRAVENOUS at 11:02

## 2019-03-08 RX ADMIN — PROPOFOL 100 MCG/KG/MIN: 10 INJECTION, EMULSION INTRAVENOUS at 11:02

## 2019-03-08 RX ADMIN — SODIUM CHLORIDE, POTASSIUM CHLORIDE, SODIUM LACTATE AND CALCIUM CHLORIDE 30 ML/HR: 600; 310; 30; 20 INJECTION, SOLUTION INTRAVENOUS at 10:03

## 2019-03-08 RX ADMIN — LIDOCAINE HYDROCHLORIDE 60 MG: 20 INJECTION, SOLUTION INFILTRATION; PERINEURAL at 11:02

## 2019-03-08 NOTE — ANESTHESIA PREPROCEDURE EVALUATION
Anesthesia Evaluation     NPO Solid Status: > 8 hours             Airway   Mallampati: II  TM distance: >3 FB  Neck ROM: full  No difficulty expected  Dental - normal exam     Pulmonary - normal exam   Cardiovascular - normal exam    (+) hypertension, hyperlipidemia,       Neuro/Psych  GI/Hepatic/Renal/Endo    (+)  GI bleeding,     Musculoskeletal     Abdominal    Substance History      OB/GYN          Other   (+) arthritis   history of cancer                    Anesthesia Plan    ASA 2     MAC     intravenous induction   Anesthetic plan, all risks, benefits, and alternatives have been provided, discussed and informed consent has been obtained with: patient.

## 2019-03-08 NOTE — ANESTHESIA POSTPROCEDURE EVALUATION
Patient: Chacorta Langston    Procedure Summary     Date:  03/08/19 Room / Location:   [facility]    Anesthesia Start:  1101 Anesthesia Stop:  1118    Procedure:  ESOPHAGOGASTRODUODENOSCOPY (N/A Esophagus) Diagnosis:      Surgeon:  Maxx Tavarez MD Provider:  Alena Kearney MD    Anesthesia Type:  MAC ASA Status:  2          Anesthesia Type: MAC  Last vitals  BP   123/71 (03/08/19 0956)   Temp   36.9 °C (98.5 °F) (03/08/19 0956)   Pulse   70 (03/08/19 0956)   Resp   10 (03/08/19 0956)     SpO2   98 % (03/08/19 0956)     Post Anesthesia Care and Evaluation    Patient location during evaluation: bedside  Patient participation: complete - patient participated  Level of consciousness: awake  Pain management: adequate  Airway patency: patent  Anesthetic complications: No anesthetic complications    Cardiovascular status: acceptable  Respiratory status: acceptable  Hydration status: acceptable

## 2019-03-08 NOTE — DISCHARGE INSTRUCTIONS
For the next 24 hours patient needs to be with a responsible adult.    For 24 hours DO NOT drive, operate machinery, appliances, drink alcohol, make important decisions or sign legal documents.    Start with a light or bland diet if you are feeling sick to your stomach otherwise advance to regular diet as tolerated.    Call Dr Tavarez for problems [phone]    Problems may include but not limited to: large amounts of bleeding, trouble breathing, repeated vomiting, severe unrelieved pain, fever or chills.

## 2019-03-08 NOTE — H&P
Patient Care Team:  Vikram Grace MD as PCP - General (Internal Medicine)  Tramaine Velázquez MD as PCP - Claims Attributed    Chief complaint gastric ulcer    Subjective     History of Present Illness    Review of Systems   All other systems reviewed and are negative.       Past Medical History:   Diagnosis Date   • Cancer (CMS/HCC) 2016    Basal Cell 5 locations in 15 yrs   • Hypertension      Past Surgical History:   Procedure Laterality Date   • COLONOSCOPY     • COLONOSCOPY N/A 12/7/2018    Procedure: COLONOSCOPY to cecum and terminal ileum;  Surgeon: Maxx Tavarez MD;  Location: [facility] ENDOSCOPY;  Service: Gastroenterology   • ENDOSCOPY N/A 12/7/2018    Procedure: ESOPHAGOGASTRODUODENOSCOPY with biopsies, resolution clip X 1 placed;  Surgeon: Maxx Tavarez MD;  Location: [facility] ENDOSCOPY;  Service: Gastroenterology     Family History   Problem Relation Age of Onset   • COPD Other    • Heart failure Other    • Diabetes Other    • Stroke Other    • Hypertension Other      Social History     Tobacco Use   • Smoking status: Never Smoker   Substance Use Topics   • Alcohol use: Yes     Comment: rarely   • Drug use: No     No medications prior to admission.     Allergies:  Beta adrenergic blockers and Penicillins    Objective      Vital Signs  Temp:  [98.5 °F (36.9 °C)] 98.5 °F (36.9 °C)  Heart Rate:  [70] 70  Resp:  [10] 10  BP: (123)/(71) 123/71    Physical Exam   Constitutional: He is oriented to person, place, and time. He appears well-developed and well-nourished.   Eyes: Conjunctivae are normal.   Neck: Neck supple.   Cardiovascular: Normal rate and regular rhythm.   Pulmonary/Chest: Effort normal and breath sounds normal.   Abdominal: Soft. Bowel sounds are normal.   Neurological: He is alert and oriented to person, place, and time.   Skin: Skin is warm and dry.   Psychiatric: He has a normal mood and affect.   Vitals reviewed.      Results Review:   I reviewed the patient's other test  results and agree with the interpretation      Assessment/Plan       * No active hospital problems. *      Assessment:  (History of gastric ulcer ).     Plan:   (Colonoscopy risks, alternatives and benefits discussed with patient and the patient is agreeable to having procedure done.).       I discussed the patients findings and my recommendations with patient and nursing staff    Maxx Tavarez MD  03/08/19  11:08 AM

## 2019-03-27 ENCOUNTER — OFFICE VISIT (OUTPATIENT)
Dept: INTERNAL MEDICINE | Facility: CLINIC | Age: 74
End: 2019-03-27

## 2019-03-27 VITALS
OXYGEN SATURATION: 100 % | HEIGHT: 72 IN | SYSTOLIC BLOOD PRESSURE: 121 MMHG | TEMPERATURE: 97.2 F | BODY MASS INDEX: 24.52 KG/M2 | DIASTOLIC BLOOD PRESSURE: 67 MMHG | WEIGHT: 181 LBS | HEART RATE: 89 BPM

## 2019-03-27 DIAGNOSIS — L27.0 DRUG RASH: Primary | ICD-10-CM

## 2019-03-27 PROCEDURE — 99212 OFFICE O/P EST SF 10 MIN: CPT | Performed by: NURSE PRACTITIONER

## 2019-03-27 RX ORDER — METHYLPREDNISOLONE 4 MG/1
TABLET ORAL
Qty: 21 TABLET | Refills: 0 | Status: SHIPPED | OUTPATIENT
Start: 2019-03-27 | End: 2019-10-08

## 2019-03-27 NOTE — PROGRESS NOTES
Name: Chacorta Langston  :  1945    Subjective:      Chief Complaint   Patient presents with   • Rash     c/c pt states here for rash all over body since yesterday seen in urgent care X 8 days ago for a dog bite         Chacorta Langston is a 73 y.o. male patient of Vikram Grace MD who is here for rash.    3/19 he had a dog bite.  Seen by urgent care and treated with Clindamycin 150mg TID.  He was on his 6th day and developed a rash started on neck then down to chest and arms.    He has tried benadryl but it did not help.  No difficulty breathing, no shortness of air.     The dog bite site has improved.  No drainage.  He denies fever or chills.         I have reviewed the patient's medical history in detail and updated the computerized patient record.    Past Medical History:   Diagnosis Date   • Cancer (CMS/HCC) 2016    Basal Cell 5 locations in 15 yrs   • Hypertension        Past Surgical History:   Procedure Laterality Date   • COLONOSCOPY     • COLONOSCOPY N/A 2018    Procedure: COLONOSCOPY to cecum and terminal ileum;  Surgeon: Maxx Tavarez MD;  Location: [facility] ENDOSCOPY;  Service: Gastroenterology   • ENDOSCOPY N/A 2018    Procedure: ESOPHAGOGASTRODUODENOSCOPY with biopsies, resolution clip X 1 placed;  Surgeon: Maxx Tavarez MD;  Location: [facility] ENDOSCOPY;  Service: Gastroenterology   • ENDOSCOPY N/A 3/8/2019    Procedure: ESOPHAGOGASTRODUODENOSCOPY;  Surgeon: Maxx Tavarez MD;  Location: [facility] ENDOSCOPY;  Service: Gastroenterology       Family History   Problem Relation Age of Onset   • COPD Other    • Heart failure Other    • Diabetes Other    • Stroke Other    • Hypertension Other        Social History     Socioeconomic History   • Marital status:      Spouse name: Not on file   • Number of children: Not on file   • Years of education: Not on file   • Highest education level: Not on file   Tobacco Use   • Smoking status: Never Smoker   Substance and  "Sexual Activity   • Alcohol use: Yes     Comment: rarely   • Drug use: No   • Sexual activity: Defer       Most Recent Immunizations   Administered Date(s) Administered   • TD Preservative Free 03/19/2019         Review of Systems:   Review of Systems   Constitutional: Negative for fever.   Respiratory: Negative for wheezing.    Skin: Positive for rash.         Objective:      Physical Exam:   Physical Exam   HENT:   Mouth/Throat: Uvula is midline, oropharynx is clear and moist and mucous membranes are normal. No posterior oropharyngeal edema or posterior oropharyngeal erythema.   No lesions in mouth    Neck: Trachea normal.   Cardiovascular: Normal rate and regular rhythm.   Pulmonary/Chest: Effort normal and breath sounds normal.   Skin: Rash noted. Rash is macular and urticarial.        Graphic: Wound - w/d/i    Symmetrically arranged, bright erythematous macular/papular rash. Disseminated on arms, confluent at chest and mid back          Vital Signs:     Vitals:    03/27/19 1300   BP: 121/67   BP Location: Left arm   Patient Position: Sitting   Cuff Size: Small Adult   Pulse: 89   Temp: 97.2 °F (36.2 °C)   TempSrc: Oral   SpO2: 100%   Weight: 82.1 kg (181 lb)   Height: 182.9 cm (72\")         Requested Prescriptions     Signed Prescriptions Disp Refills   • methylPREDNISolone (MEDROL, LATIA,) 4 MG tablet 21 tablet 0     Sig: Take as directed on package instructions.         Current Outpatient Medications:   •  amLODIPine-benazepril (LOTREL) 10-20 MG per capsule, TAKE ONE CAPSULE BY MOUTH DAILY, Disp: 90 capsule, Rfl: 2  •  esomeprazole (nexIUM) 20 MG capsule, Take 20 mg by mouth 2 (Two) Times a Day., Disp: , Rfl:   •  saw palmetto 160 MG capsule, Take  by mouth 2 (two) times a day., Disp: , Rfl:   •  vitamin C (ASCORBIC ACID) 250 MG tablet, Take  by mouth daily., Disp: , Rfl:   •  methylPREDNISolone (MEDROL, LATIA,) 4 MG tablet, Take as directed on package instructions., Disp: 21 tablet, Rfl: 0  •  Multiple " "Vitamins-Iron (MULTI-VITAMIN/IRON) tablet, Take 1 tablet by mouth Daily., Disp: , Rfl:        Assessment and Plan:          Problem List Items Addressed This Visit     None      Visit Diagnoses     Drug rash    -  Primary        Exanthematous drug eruption.  Will start medrol pack.  Patient will return or notify office if not improved.      Discussed any change in Rx and discussed visit with patient.  All questions answered.      Return if symptoms worsen or fail to improve.    Melchor \"Rayray\" Jenny, ERNESTINA   03/27/19    Additional Patient Counseling:       There are no Patient Instructions on file for this visit.  "

## 2019-04-17 ENCOUNTER — OFFICE (OUTPATIENT)
Dept: URBAN - METROPOLITAN AREA CLINIC 66 | Facility: CLINIC | Age: 74
End: 2019-04-17

## 2019-04-17 VITALS
WEIGHT: 172 LBS | DIASTOLIC BLOOD PRESSURE: 68 MMHG | HEIGHT: 72 IN | HEART RATE: 74 BPM | SYSTOLIC BLOOD PRESSURE: 110 MMHG

## 2019-04-17 DIAGNOSIS — R19.4 CHANGE IN BOWEL HABIT: ICD-10-CM

## 2019-04-17 DIAGNOSIS — R19.7 DIARRHEA, UNSPECIFIED: ICD-10-CM

## 2019-04-17 PROCEDURE — 99213 OFFICE O/P EST LOW 20 MIN: CPT | Performed by: INTERNAL MEDICINE

## 2019-06-20 ENCOUNTER — LAB (OUTPATIENT)
Dept: LAB | Facility: HOSPITAL | Age: 74
End: 2019-06-20

## 2019-06-20 ENCOUNTER — TRANSCRIBE ORDERS (OUTPATIENT)
Dept: ADMINISTRATIVE | Facility: HOSPITAL | Age: 74
End: 2019-06-20

## 2019-06-20 DIAGNOSIS — R19.4 CHANGE IN BOWEL HABITS: Primary | ICD-10-CM

## 2019-06-20 DIAGNOSIS — R19.7 DIARRHEA, UNSPECIFIED TYPE: ICD-10-CM

## 2019-06-20 DIAGNOSIS — R63.4 WEIGHT LOSS: ICD-10-CM

## 2019-06-20 DIAGNOSIS — R19.4 CHANGE IN BOWEL HABITS: ICD-10-CM

## 2019-06-20 LAB
ALBUMIN SERPL-MCNC: 4.1 G/DL (ref 3.5–5.2)
ALBUMIN/GLOB SERPL: 1.5 G/DL
ALP SERPL-CCNC: 59 U/L (ref 39–117)
ALT SERPL W P-5'-P-CCNC: 8 U/L (ref 1–41)
ANION GAP SERPL CALCULATED.3IONS-SCNC: 11.4 MMOL/L
AST SERPL-CCNC: 13 U/L (ref 1–40)
BASOPHILS # BLD AUTO: 0.03 10*3/MM3 (ref 0–0.2)
BASOPHILS NFR BLD AUTO: 0.6 % (ref 0–1.5)
BILIRUB SERPL-MCNC: 0.3 MG/DL (ref 0.2–1.2)
BUN BLD-MCNC: 12 MG/DL (ref 8–23)
BUN/CREAT SERPL: 11.7 (ref 7–25)
CALCIUM SPEC-SCNC: 9.3 MG/DL (ref 8.6–10.5)
CHLORIDE SERPL-SCNC: 101 MMOL/L (ref 98–107)
CO2 SERPL-SCNC: 25.6 MMOL/L (ref 22–29)
CREAT BLD-MCNC: 1.03 MG/DL (ref 0.76–1.27)
CRP SERPL-MCNC: 0.15 MG/DL (ref 0–0.5)
DEPRECATED RDW RBC AUTO: 53.5 FL (ref 37–54)
EOSINOPHIL # BLD AUTO: 0.06 10*3/MM3 (ref 0–0.4)
EOSINOPHIL NFR BLD AUTO: 1.1 % (ref 0.3–6.2)
ERYTHROCYTE [DISTWIDTH] IN BLOOD BY AUTOMATED COUNT: 14.6 % (ref 12.3–15.4)
GFR SERPL CREATININE-BSD FRML MDRD: 71 ML/MIN/1.73
GLOBULIN UR ELPH-MCNC: 2.7 GM/DL
GLUCOSE BLD-MCNC: 88 MG/DL (ref 65–99)
HCT VFR BLD AUTO: 38.3 % (ref 37.5–51)
HGB BLD-MCNC: 11.8 G/DL (ref 13–17.7)
IMM GRANULOCYTES # BLD AUTO: 0.04 10*3/MM3 (ref 0–0.05)
IMM GRANULOCYTES NFR BLD AUTO: 0.8 % (ref 0–0.5)
LYMPHOCYTES # BLD AUTO: 1.19 10*3/MM3 (ref 0.7–3.1)
LYMPHOCYTES NFR BLD AUTO: 22.4 % (ref 19.6–45.3)
MCH RBC QN AUTO: 30.6 PG (ref 26.6–33)
MCHC RBC AUTO-ENTMCNC: 30.8 G/DL (ref 31.5–35.7)
MCV RBC AUTO: 99.2 FL (ref 79–97)
MONOCYTES # BLD AUTO: 0.47 10*3/MM3 (ref 0.1–0.9)
MONOCYTES NFR BLD AUTO: 8.8 % (ref 5–12)
NEUTROPHILS # BLD AUTO: 3.53 10*3/MM3 (ref 1.7–7)
NEUTROPHILS NFR BLD AUTO: 66.3 % (ref 42.7–76)
NRBC BLD AUTO-RTO: 0 /100 WBC (ref 0–0.2)
PLATELET # BLD AUTO: 356 10*3/MM3 (ref 140–450)
PMV BLD AUTO: 10.5 FL (ref 6–12)
POTASSIUM BLD-SCNC: 4 MMOL/L (ref 3.5–5.2)
PROT SERPL-MCNC: 6.8 G/DL (ref 6–8.5)
RBC # BLD AUTO: 3.86 10*6/MM3 (ref 4.14–5.8)
SODIUM BLD-SCNC: 138 MMOL/L (ref 136–145)
T3 SERPL-MCNC: 83.3 NG/DL (ref 80–200)
T4 FREE SERPL-MCNC: 1.11 NG/DL (ref 0.93–1.7)
TSH SERPL DL<=0.05 MIU/L-ACNC: 1.89 MIU/ML (ref 0.27–4.2)
WBC NRBC COR # BLD: 5.32 10*3/MM3 (ref 3.4–10.8)

## 2019-06-20 PROCEDURE — 84480 ASSAY TRIIODOTHYRONINE (T3): CPT

## 2019-06-20 PROCEDURE — 36415 COLL VENOUS BLD VENIPUNCTURE: CPT

## 2019-06-20 PROCEDURE — 84439 ASSAY OF FREE THYROXINE: CPT

## 2019-06-20 PROCEDURE — 84443 ASSAY THYROID STIM HORMONE: CPT

## 2019-06-20 PROCEDURE — 86140 C-REACTIVE PROTEIN: CPT

## 2019-06-20 PROCEDURE — 85025 COMPLETE CBC W/AUTO DIFF WBC: CPT

## 2019-06-20 PROCEDURE — 80053 COMPREHEN METABOLIC PANEL: CPT

## 2019-06-21 LAB
ENDOMYSIUM IGA SER QL: NEGATIVE
GLIADIN PEPTIDE IGA SER-ACNC: 5 UNITS (ref 0–19)
GLIADIN PEPTIDE IGG SER-ACNC: 2 UNITS (ref 0–19)
IGA SERPL-MCNC: 321 MG/DL (ref 61–437)
TTG IGA SER-ACNC: <2 U/ML (ref 0–3)
TTG IGG SER-ACNC: <2 U/ML (ref 0–5)

## 2019-06-21 PROCEDURE — 87177 OVA AND PARASITES SMEARS: CPT

## 2019-06-21 PROCEDURE — 87209 SMEAR COMPLEX STAIN: CPT

## 2019-06-22 PROCEDURE — 87209 SMEAR COMPLEX STAIN: CPT

## 2019-06-22 PROCEDURE — 87177 OVA AND PARASITES SMEARS: CPT

## 2019-06-23 ENCOUNTER — LAB (OUTPATIENT)
Dept: LAB | Facility: HOSPITAL | Age: 74
End: 2019-06-23

## 2019-06-23 ENCOUNTER — TRANSCRIBE ORDERS (OUTPATIENT)
Dept: LAB | Facility: HOSPITAL | Age: 74
End: 2019-06-23

## 2019-06-23 DIAGNOSIS — R19.7 DIARRHEA, UNSPECIFIED TYPE: ICD-10-CM

## 2019-06-23 DIAGNOSIS — R63.4 LOSS OF WEIGHT: ICD-10-CM

## 2019-06-23 DIAGNOSIS — R19.4 CHANGE IN BOWEL HABITS: Primary | ICD-10-CM

## 2019-06-23 PROCEDURE — 87329 GIARDIA AG IA: CPT

## 2019-06-23 PROCEDURE — 87209 SMEAR COMPLEX STAIN: CPT

## 2019-06-23 PROCEDURE — 89125 SPECIMEN FAT STAIN: CPT

## 2019-06-23 PROCEDURE — 83993 ASSAY FOR CALPROTECTIN FECAL: CPT

## 2019-06-23 PROCEDURE — 82656 EL-1 FECAL QUAL/SEMIQ: CPT

## 2019-06-23 PROCEDURE — 87177 OVA AND PARASITES SMEARS: CPT

## 2019-06-25 LAB
FATTY ACIDS: NORMAL
G LAMBLIA AG STL QL IA: NEGATIVE
NEUTRAL FATS: NORMAL

## 2019-06-26 LAB
ELASTASE PANC STL-MCNT: >500 UG ELAST./G
O+P SPEC MICRO: NORMAL
OVA + PARASITE RESULT 1: NORMAL

## 2019-07-03 LAB — CALPROTECTIN STL-MCNT: <16 UG/G (ref 0–120)

## 2019-10-08 ENCOUNTER — OFFICE VISIT (OUTPATIENT)
Dept: INTERNAL MEDICINE | Facility: CLINIC | Age: 74
End: 2019-10-08

## 2019-10-08 VITALS
WEIGHT: 164.8 LBS | BODY MASS INDEX: 22.32 KG/M2 | DIASTOLIC BLOOD PRESSURE: 80 MMHG | SYSTOLIC BLOOD PRESSURE: 130 MMHG | HEIGHT: 72 IN

## 2019-10-08 DIAGNOSIS — D50.9 IRON DEFICIENCY ANEMIA, UNSPECIFIED IRON DEFICIENCY ANEMIA TYPE: ICD-10-CM

## 2019-10-08 DIAGNOSIS — I10 ESSENTIAL HYPERTENSION: Primary | ICD-10-CM

## 2019-10-08 PROBLEM — K57.90 DIVERTICULOSIS: Status: ACTIVE | Noted: 2019-10-08

## 2019-10-08 PROBLEM — A49.8 CLOSTRIDIUM DIFFICILE INFECTION: Status: ACTIVE | Noted: 2019-10-08

## 2019-10-08 PROCEDURE — 99214 OFFICE O/P EST MOD 30 MIN: CPT | Performed by: INTERNAL MEDICINE

## 2019-10-08 RX ORDER — LORATADINE 10 MG/1
CAPSULE, LIQUID FILLED ORAL
COMMUNITY
End: 2021-03-05

## 2019-10-08 RX ORDER — CALCIUM POLYCARBOPHIL 625 MG 625 MG/1
625 TABLET ORAL DAILY
COMMUNITY
End: 2021-11-05

## 2019-10-08 RX ORDER — AMLODIPINE BESYLATE AND BENAZEPRIL HYDROCHLORIDE 10; 20 MG/1; MG/1
1 CAPSULE ORAL DAILY
Qty: 90 CAPSULE | Refills: 2 | Status: SHIPPED | OUTPATIENT
Start: 2019-10-08 | End: 2020-08-10

## 2019-10-08 RX ORDER — SACCHAROMYCES BOULARDII 250 MG
250 CAPSULE ORAL DAILY
COMMUNITY
End: 2021-11-05

## 2019-10-08 NOTE — PROGRESS NOTES
Subjective         Chief Complaint   Patient presents with   • [facility] Care     new pt [facility] care DR Grace transfer   • Hypertension           Chacorta Langston is a 74 y.o. male who presents for    Patient Active Problem List   Diagnosis   • Basal cell cancer   • DDD (degenerative disc disease), lumbosacral   • HLD (hyperlipidemia)   • BP (high blood pressure)   • Acute gastric ulcer with hemorrhage   • Schatzki's ring of distal esophagus   • Nocturia   • Clostridium difficile infection   • Diverticulosis   • Iron deficiency anemia       History of Present Illness     He denies chest pain, dyspnea, melena, hematochezia, headache or edema. He does not check his BP at home. He does walk some. He had an ulcer in his stomach last December. He was seen by Dr. Tavarez and he had a repeat EGD in March that showed healing of the ulcer. He sees derm for his history of BCC. He sees Dr. Tramaine Velázquez once per year for an enlarged prostate.  Allergies   Allergen Reactions   • Clindamycin/Lincomycin Rash   • Beta Adrenergic Blockers Unknown (See Comments)     'COLD FEET'   • Penicillins Rash       Current Outpatient Medications on File Prior to Visit   Medication Sig Dispense Refill   • calcium carbonate-cholecalciferol (CALCIUM 500 +D) 500-400 MG-UNIT tablet tablet Take  by mouth Daily.     • calcium polycarbophil (FIBERCON) 625 MG tablet Take 625 mg by mouth Daily.     • Loratadine 10 MG capsule Take  by mouth.     • saccharomyces boulardii (FLORASTOR) 250 MG capsule Take 250 mg by mouth Daily.     • saw palmetto 160 MG capsule Take 450 mg by mouth 4 (Four) Times a Day.     • [DISCONTINUED] amLODIPine-benazepril (LOTREL) 10-20 MG per capsule TAKE ONE CAPSULE BY MOUTH DAILY 90 capsule 2   • Multiple Vitamins-Iron (MULTI-VITAMIN/IRON) tablet Take 1 tablet by mouth Daily.     • [DISCONTINUED] esomeprazole (nexIUM) 20 MG capsule Take 20 mg by mouth 2 (Two) Times a Day.     • [DISCONTINUED] methylPREDNISolone (MEDROL, LATIA,) 4  MG tablet Take as directed on package instructions. 21 tablet 0   • [DISCONTINUED] vitamin C (ASCORBIC ACID) 250 MG tablet Take  by mouth daily.       No current facility-administered medications on file prior to visit.        History reviewed. No pertinent past medical history.    Past Surgical History:   Procedure Laterality Date   • COLONOSCOPY     • COLONOSCOPY N/A 12/7/2018    Procedure: COLONOSCOPY to cecum and terminal ileum;  Surgeon: Maxx Tavarez MD;  Location: [facility] ENDOSCOPY;  Service: Gastroenterology   • ENDOSCOPY N/A 12/7/2018    Procedure: ESOPHAGOGASTRODUODENOSCOPY with biopsies, resolution clip X 1 placed;  Surgeon: Maxx Tavarez MD;  Location: [facility] ENDOSCOPY;  Service: Gastroenterology   • ENDOSCOPY N/A 3/8/2019    Procedure: ESOPHAGOGASTRODUODENOSCOPY;  Surgeon: Maxx Tavarez MD;  Location: [facility] ENDOSCOPY;  Service: Gastroenterology       Family History   Problem Relation Age of Onset   • COPD Mother    • Hypertension Mother    • Stroke Mother    • Osteoporosis Mother    • COPD Father    • Pneumonia Father        Social History     Socioeconomic History   • Marital status:      Spouse name: Not on file   • Number of children: Not on file   • Years of education: Not on file   • Highest education level: Not on file   Tobacco Use   • Smoking status: Never Smoker   • Smokeless tobacco: Never Used   Substance and Sexual Activity   • Alcohol use: Yes     Frequency: 2-3 times a week     Drinks per session: 1 or 2     Comment: rarely   • Drug use: No   • Sexual activity: Defer           The following portions of the patient's history were reviewed and updated as appropriate: problem list, allergies, current medications, past medical history, past family history, past social history and past surgical history.    Review of Systems   Respiratory: Negative for shortness of breath.    Cardiovascular: Negative for chest pain.       Immunization History   Administered Date(s) Administered  "  • TD Preservative Free 03/19/2019       Objective   Vitals:    10/08/19 1241   BP: 130/80   Weight: 74.8 kg (164 lb 12.8 oz)   Height: 182.9 cm (72\")     Physical Exam   Constitutional: He appears well-developed and well-nourished.   HENT:   Head: Normocephalic and atraumatic.   Neck: Carotid bruit is not present.   Cardiovascular: Normal rate, regular rhythm, S1 normal, S2 normal and normal heart sounds.   Pulmonary/Chest: Effort normal and breath sounds normal.   Abdominal: There is no hepatosplenomegaly. There is no tenderness.   Neurological: He is alert.   Skin: Skin is warm.   Psychiatric: He has a normal mood and affect.   Vitals reviewed.      Procedures    Assessment/Plan   Chacorta was seen today for establish care and hypertension.    Diagnoses and all orders for this visit:    Essential hypertension  -     amLODIPine-benazepril (LOTREL) 10-20 MG per capsule; Take 1 capsule by mouth Daily.  -     Comprehensive Metabolic Panel; Future  -     Lipid Panel With / Chol / HDL Ratio; Future    Iron deficiency anemia, unspecified iron deficiency anemia type  -     CBC & Differential             Declines flu or pneumonia shots. Records from last year reviewed. BP is good. Repeat CBC since he describes some anemia before his GI bleed. Over 25 minutes spent with over 50 percent on counseling and coordinating care.    Return in about 6 months (around 4/8/2020) for Annual physical.  "

## 2019-10-09 LAB
BASOPHILS # BLD AUTO: 0.05 10*3/MM3 (ref 0–0.2)
BASOPHILS NFR BLD AUTO: 0.7 % (ref 0–1.5)
EOSINOPHIL # BLD AUTO: 0.09 10*3/MM3 (ref 0–0.4)
EOSINOPHIL NFR BLD AUTO: 1.3 % (ref 0.3–6.2)
ERYTHROCYTE [DISTWIDTH] IN BLOOD BY AUTOMATED COUNT: 13 % (ref 12.3–15.4)
HCT VFR BLD AUTO: 39 % (ref 37.5–51)
HGB BLD-MCNC: 12.8 G/DL (ref 13–17.7)
IMM GRANULOCYTES # BLD AUTO: 0.05 10*3/MM3 (ref 0–0.05)
IMM GRANULOCYTES NFR BLD AUTO: 0.7 % (ref 0–0.5)
LYMPHOCYTES # BLD AUTO: 1.74 10*3/MM3 (ref 0.7–3.1)
LYMPHOCYTES NFR BLD AUTO: 24.2 % (ref 19.6–45.3)
MCH RBC QN AUTO: 31.1 PG (ref 26.6–33)
MCHC RBC AUTO-ENTMCNC: 32.8 G/DL (ref 31.5–35.7)
MCV RBC AUTO: 94.9 FL (ref 79–97)
MONOCYTES # BLD AUTO: 0.54 10*3/MM3 (ref 0.1–0.9)
MONOCYTES NFR BLD AUTO: 7.5 % (ref 5–12)
NEUTROPHILS # BLD AUTO: 4.72 10*3/MM3 (ref 1.7–7)
NEUTROPHILS NFR BLD AUTO: 65.6 % (ref 42.7–76)
NRBC BLD AUTO-RTO: 0 /100 WBC (ref 0–0.2)
PLATELET # BLD AUTO: 321 10*3/MM3 (ref 140–450)
RBC # BLD AUTO: 4.11 10*6/MM3 (ref 4.14–5.8)
WBC # BLD AUTO: 7.19 10*3/MM3 (ref 3.4–10.8)

## 2020-04-05 ENCOUNTER — RESULTS ENCOUNTER (OUTPATIENT)
Dept: INTERNAL MEDICINE | Facility: CLINIC | Age: 75
End: 2020-04-05

## 2020-04-05 DIAGNOSIS — I10 ESSENTIAL HYPERTENSION: ICD-10-CM

## 2020-05-14 ENCOUNTER — TELEPHONE (OUTPATIENT)
Dept: CASE MANAGEMENT | Facility: OTHER | Age: 75
End: 2020-05-14

## 2020-05-14 NOTE — TELEPHONE ENCOUNTER
Left message for patient to return Ambulatory Care Manager's call to schedule AWV. Phone number provided [phone].

## 2020-08-10 DIAGNOSIS — I10 ESSENTIAL HYPERTENSION: ICD-10-CM

## 2020-08-10 RX ORDER — AMLODIPINE BESYLATE AND BENAZEPRIL HYDROCHLORIDE 10; 20 MG/1; MG/1
CAPSULE ORAL
Qty: 90 CAPSULE | Refills: 1 | Status: SHIPPED | OUTPATIENT
Start: 2020-08-10 | End: 2020-08-28 | Stop reason: SDUPTHER

## 2020-08-28 ENCOUNTER — OFFICE VISIT (OUTPATIENT)
Dept: INTERNAL MEDICINE | Facility: CLINIC | Age: 75
End: 2020-08-28

## 2020-08-28 VITALS
BODY MASS INDEX: 22.89 KG/M2 | WEIGHT: 169 LBS | HEIGHT: 72 IN | TEMPERATURE: 98.1 F | DIASTOLIC BLOOD PRESSURE: 80 MMHG | SYSTOLIC BLOOD PRESSURE: 128 MMHG

## 2020-08-28 DIAGNOSIS — Z00.00 MEDICARE ANNUAL WELLNESS VISIT, SUBSEQUENT: Primary | ICD-10-CM

## 2020-08-28 DIAGNOSIS — I10 ESSENTIAL HYPERTENSION: ICD-10-CM

## 2020-08-28 PROCEDURE — G0439 PPPS, SUBSEQ VISIT: HCPCS | Performed by: INTERNAL MEDICINE

## 2020-08-28 RX ORDER — AMLODIPINE BESYLATE AND BENAZEPRIL HYDROCHLORIDE 10; 20 MG/1; MG/1
1 CAPSULE ORAL DAILY
Qty: 90 CAPSULE | Refills: 1 | Status: SHIPPED | OUTPATIENT
Start: 2020-08-28 | End: 2021-08-09

## 2020-08-28 NOTE — PROGRESS NOTES
The ABCs of the Annual Wellness Visit  Subsequent Medicare Wellness Visit    Chief Complaint   Patient presents with   • Medicare Wellness-subsequent       Subjective   History of Present Illness:  Chacorta Langston is a 75 y.o. male who presents for a Subsequent Medicare Wellness Visit.  He does not check his BP. He denies chest pain or dyspnea. He sees derm once per year. He sees the urologist and his last PSA was in the 4s which was decreased for him.  HEALTH RISK ASSESSMENT    Recent Hospitalizations:  No hospitalization(s) within the last year.    Current Medical Providers:  Patient Care Team:  Baron Colón MD as PCP - General (Internal Medicine)  Susannah Rendon APRN as PCP - Claims Attributed    Smoking Status:  Social History     Tobacco Use   Smoking Status Never Smoker   Smokeless Tobacco Never Used       Alcohol Consumption:  Social History     Substance and Sexual Activity   Alcohol Use Yes   • Frequency: 2-3 times a week   • Drinks per session: 1 or 2    Comment: rarely       Depression Screen:   PHQ-2/PHQ-9 Depression Screening 8/28/2020   Little interest or pleasure in doing things 0   Feeling down, depressed, or hopeless 0   Total Score 0       Fall Risk Screen:  STEADI Fall Risk Assessment was completed, and patient is at LOW risk for falls.Assessment completed on:8/28/2020    Health Habits and Functional and Cognitive Screening:  Functional & Cognitive Status 8/28/2020   Do you have difficulty preparing food and eating? No   Do you have difficulty bathing yourself, getting dressed or grooming yourself? No   Do you have difficulty using the toilet? No   Do you have difficulty moving around from place to place? No   Do you have trouble with steps or getting out of a bed or a chair? No   Current Diet Well Balanced Diet   Dental Exam Up to date   Eye Exam Up to date   Exercise (times per week) 2 times per week   Current Exercise Activities Include Walking   Do you need help using the phone?   No   Are you deaf or do you have serious difficulty hearing?  No   Do you need help with transportation? No   Do you need help shopping? No   Do you need help preparing meals?  No   Do you need help with housework?  No   Do you need help with laundry? No   Do you need help taking your medications? No   Do you need help managing money? No   Do you ever drive or ride in a car without wearing a seat belt? No   Have you felt unusual stress, anger or loneliness in the last month? No   Who do you live with? Spouse   If you need help, do you have trouble finding someone available to you? No   Have you been bothered in the last four weeks by sexual problems? No   Do you have difficulty concentrating, remembering or making decisions? No         Does the patient have evidence of cognitive impairment? No    Asprin use counseling:Does not need ASA (and currently is not on it)    Age-appropriate Screening Schedule:  Refer to the list below for future screening recommendations based on patient's age, sex and/or medical conditions. Orders for these recommended tests are listed in the plan section. The patient has been provided with a written plan.    Health Maintenance   Topic Date Due   • LIPID PANEL  12/11/2019   • ZOSTER VACCINE (1 of 2) 08/28/2020 (Originally 6/[birth year]/1995)   • INFLUENZA VACCINE  01/01/2032 (Originally 8/1/2020)   • COLONOSCOPY  12/07/2028   • TDAP/TD VACCINES (2 - Tdap) 03/19/2029          The following portions of the patient's history were reviewed and updated as appropriate: allergies, current medications, past family history, past medical history, past social history, past surgical history and problem list.    Outpatient Medications Prior to Visit   Medication Sig Dispense Refill   • BIOTIN PO Take  by mouth.     • calcium carbonate-cholecalciferol (CALCIUM 500 +D) 500-400 MG-UNIT tablet tablet Take  by mouth Daily.     • calcium polycarbophil (FIBERCON) 625 MG tablet Take 625 mg by mouth Daily.     •  "Loratadine 10 MG capsule Take  by mouth.     • saccharomyces boulardii (FLORASTOR) 250 MG capsule Take 250 mg by mouth Daily.     • saw palmetto 160 MG capsule Take 450 mg by mouth 4 (Four) Times a Day.     • amLODIPine-benazepril (LOTREL) 10-20 MG per capsule TAKE ONE CAPSULE BY MOUTH DAILY 90 capsule 1   • Multiple Vitamins-Iron (MULTI-VITAMIN/IRON) tablet Take 1 tablet by mouth Daily.       No facility-administered medications prior to visit.        Patient Active Problem List   Diagnosis   • Basal cell cancer   • DDD (degenerative disc disease), lumbosacral   • Other hyperlipidemia   • BP (high blood pressure)   • Acute gastric ulcer with hemorrhage   • Schatzki's ring of distal esophagus   • Nocturia   • Clostridium difficile infection   • Diverticulosis   • Iron deficiency anemia   • Medicare annual wellness visit, subsequent       Advanced Care Planning:  ACP discussion was held with the patient during this visit. Patient has an advance directive (not in EMR), copy requested.    Review of Systems   Respiratory: Negative for shortness of breath.    Cardiovascular: Negative for chest pain.       Compared to one year ago, the patient feels his physical health is better.  Compared to one year ago, the patient feels his mental health is the same.    Reviewed chart for potential of high risk medication in the elderly: yes  Reviewed chart for potential of harmful drug interactions in the elderly:yes    Objective         Vitals:    08/28/20 1322   BP: 128/80   Temp: 98.1 °F (36.7 °C)   Weight: 76.7 kg (169 lb)   Height: 182.9 cm (72\")       Body mass index is 22.92 kg/m².  Discussed the patient's BMI with him. The BMI is in the acceptable range.    Physical Exam   Constitutional: He appears well-developed and well-nourished.   Neck: Carotid bruit is not present.   Cardiovascular: Normal rate, regular rhythm and normal heart sounds.   Pulmonary/Chest: Effort normal and breath sounds normal.   Neurological: He is " alert.   Skin: Skin is warm.   Psychiatric: He has a normal mood and affect.   Nursing note and vitals reviewed.            Assessment/Plan   Medicare Risks and Personalized Health Plan  CMS Preventative Services Quick Reference  Advance Directive Discussion  Immunizations Discussed/Encouraged (specific immunizations; Influenza, Pneumococcal 23, Prevnar and Shingrix )    The above risks/problems have been discussed with the patient.  Pertinent information has been shared with the patient in the After Visit Summary.  Follow up plans and orders are seen below in the Assessment/Plan Section.    Diagnoses and all orders for this visit:    1. Medicare annual wellness visit, subsequent (Primary)    2. Essential hypertension  -     Comprehensive Metabolic Panel; Future  -     Lipid Panel With / Chol / HDL Ratio; Future  -     amLODIPine-benazepril (LOTREL) 10-20 MG per capsule; Take 1 capsule by mouth Daily.  Dispense: 90 capsule; Refill: 1      Follow Up:  Return in about 6 months (around 2/28/2021).     An After Visit Summary and PPPS were given to the patient.         Declines all immunizations. Labs next time. Discussed getting advanced directive. BP is good.

## 2021-03-05 ENCOUNTER — OFFICE VISIT (OUTPATIENT)
Dept: INTERNAL MEDICINE | Facility: CLINIC | Age: 76
End: 2021-03-05

## 2021-03-05 VITALS
WEIGHT: 173 LBS | TEMPERATURE: 97 F | DIASTOLIC BLOOD PRESSURE: 80 MMHG | HEIGHT: 72 IN | BODY MASS INDEX: 23.43 KG/M2 | SYSTOLIC BLOOD PRESSURE: 110 MMHG

## 2021-03-05 DIAGNOSIS — N28.9 MILD RENAL INSUFFICIENCY: ICD-10-CM

## 2021-03-05 DIAGNOSIS — E78.49 OTHER HYPERLIPIDEMIA: Primary | Chronic | ICD-10-CM

## 2021-03-05 DIAGNOSIS — I10 ESSENTIAL HYPERTENSION: Chronic | ICD-10-CM

## 2021-03-05 PROCEDURE — 99214 OFFICE O/P EST MOD 30 MIN: CPT | Performed by: INTERNAL MEDICINE

## 2021-03-05 RX ORDER — ASCORBIC ACID 500 MG
500 TABLET ORAL DAILY
COMMUNITY

## 2021-03-05 NOTE — PROGRESS NOTES
Subjective        Chief Complaint   Patient presents with   • Hypertension           Chacorta Langston is a 75 y.o. male who presents for    Patient Active Problem List   Diagnosis   • Basal cell cancer   • DDD (degenerative disc disease), lumbosacral   • Other hyperlipidemia   • BP (high blood pressure)   • Acute gastric ulcer with hemorrhage   • Schatzki's ring of distal esophagus   • Nocturia   • Clostridium difficile infection   • Diverticulosis   • Iron deficiency anemia   • Mild renal insufficiency       History of Present Illness     He has not been checking his BP. He denies chest pain, dyspnea, melena or hematochezia. He does not use NSAIDS. He drinks 3-4 glasses of water per day. He gets up once per night to urinate. Sometimes he has to sit on commode a few extra minutes in the middle of the night to empty; this is not new for him. He thinks his PSA was in the 5s with his urologist in December.  Allergies   Allergen Reactions   • Clindamycin/Lincomycin Rash   • Beta Adrenergic Blockers Unknown (See Comments)     'COLD FEET'   • Penicillins Rash       Current Outpatient Medications on File Prior to Visit   Medication Sig Dispense Refill   • amLODIPine-benazepril (LOTREL) 10-20 MG per capsule Take 1 capsule by mouth Daily. 90 capsule 1   • ascorbic acid (VITAMIN C) 500 MG tablet Take 500 mg by mouth Daily.     • BIOTIN PO Take  by mouth.     • calcium carbonate-cholecalciferol (CALCIUM 500 +D) 500-400 MG-UNIT tablet tablet Take  by mouth Daily.     • calcium polycarbophil (FIBERCON) 625 MG tablet Take 625 mg by mouth Daily.     • saccharomyces boulardii (FLORASTOR) 250 MG capsule Take 250 mg by mouth Daily.     • saw palmetto 160 MG capsule Take 450 mg by mouth 4 (Four) Times a Day.     • [DISCONTINUED] Loratadine 10 MG capsule Take  by mouth.       No current facility-administered medications on file prior to visit.        History reviewed. No pertinent past medical history.    Past Surgical History:  "  Procedure Laterality Date   • COLONOSCOPY     • COLONOSCOPY N/A 12/7/2018    Procedure: COLONOSCOPY to cecum and terminal ileum;  Surgeon: Maxx Tavarez MD;  Location: [facility] ENDOSCOPY;  Service: Gastroenterology   • ENDOSCOPY N/A 12/7/2018    Procedure: ESOPHAGOGASTRODUODENOSCOPY with biopsies, resolution clip X 1 placed;  Surgeon: Maxx Tavarez MD;  [facility];  Service: Gastroenterology   • ENDOSCOPY N/A 3/8/2019    Procedure: ESOPHAGOGASTRODUODENOSCOPY;  Surgeon: Maxx Tavarez MD;  Location: [facility] ENDOSCOPY;  Service: Gastroenterology       Family History   Problem Relation Age of Onset   • COPD Mother    • Hypertension Mother    • Stroke Mother    • Osteoporosis Mother    • COPD Father    • Pneumonia Father        Social History     Socioeconomic History   • Marital status:      Spouse name: Not on file   • Number of children: Not on file   • Years of education: Not on file   • Highest education level: Not on file   Tobacco Use   • Smoking status: Never Smoker   • Smokeless tobacco: Never Used   Substance and Sexual Activity   • Alcohol use: Yes     Frequency: 2-3 times a week     Drinks per session: 1 or 2     Comment: rarely   • Drug use: No   • Sexual activity: Defer           The following portions of the patient's history were reviewed and updated as appropriate: problem list, allergies, current medications, past medical history, past family history, past social history and past surgical history.    Review of Systems    Immunization History   Administered Date(s) Administered   • TD Preservative Free 03/19/2019       Objective   Vitals:    03/05/21 1249   BP: 110/80   Temp: 97 °F (36.1 °C)   Weight: 78.5 kg (173 lb)   Height: 182.9 cm (72\")     Body mass index is 23.46 kg/m².  Physical Exam  Vitals signs reviewed.   Constitutional:       Appearance: He is well-developed.   HENT:      Head: Normocephalic and atraumatic.   Cardiovascular:      Rate and Rhythm: Normal rate " and regular rhythm.      Heart sounds: Normal heart sounds, S1 normal and S2 normal.   Pulmonary:      Effort: Pulmonary effort is normal.      Breath sounds: Normal breath sounds.   Abdominal:      Palpations: Abdomen is soft. There is no mass.      Tenderness: There is no abdominal tenderness.   Skin:     General: Skin is warm.   Neurological:      Mental Status: He is alert.   Psychiatric:         Behavior: Behavior normal.         Procedures    Assessment/Plan   Diagnoses and all orders for this visit:    1. Other hyperlipidemia (Primary)  -     Comprehensive Metabolic Panel; Future  -     Lipid Panel With / Chol / HDL Ratio; Future    2. Essential hypertension    3. Mild renal insufficiency  -     Basic Metabolic Panel  -     Urinalysis With Culture If Indicated -               Reviewed labs. BP is excellent. Declines statin. Repeat bmp today. If still up then do renal USN and consider decreasing ACE. Answered questions about COVID shots.  Return in about 6 months (around 9/5/2021) for Medicare Wellness, Lab Before FUP.

## 2021-03-07 LAB
APPEARANCE UR: CLEAR
BACTERIA #/AREA URNS HPF: ABNORMAL /HPF
BACTERIA UR CULT: NORMAL
BACTERIA UR CULT: NORMAL
BILIRUB UR QL STRIP: NEGATIVE
BUN SERPL-MCNC: 39 MG/DL (ref 8–23)
BUN/CREAT SERPL: 30 (ref 7–25)
CALCIUM SERPL-MCNC: 9.8 MG/DL (ref 8.6–10.5)
CASTS URNS MICRO: ABNORMAL
CASTS URNS QL MICRO: PRESENT /LPF
CHLORIDE SERPL-SCNC: 104 MMOL/L (ref 98–107)
CO2 SERPL-SCNC: 19.8 MMOL/L (ref 22–29)
COLOR UR: YELLOW
CREAT SERPL-MCNC: 1.3 MG/DL (ref 0.76–1.27)
EPI CELLS #/AREA URNS HPF: ABNORMAL /HPF (ref 0–10)
GLUCOSE SERPL-MCNC: 90 MG/DL (ref 65–99)
GLUCOSE UR QL: NEGATIVE
HGB UR QL STRIP: NEGATIVE
KETONES UR QL STRIP: NEGATIVE
LEUKOCYTE ESTERASE UR QL STRIP: ABNORMAL
MICRO URNS: ABNORMAL
MUCOUS THREADS URNS QL MICRO: PRESENT /HPF
NITRITE UR QL STRIP: NEGATIVE
PH UR STRIP: 5.5 [PH] (ref 5–7.5)
POTASSIUM SERPL-SCNC: 5.4 MMOL/L (ref 3.5–5.2)
PROT UR QL STRIP: NEGATIVE
RBC #/AREA URNS HPF: ABNORMAL /HPF (ref 0–2)
SODIUM SERPL-SCNC: 136 MMOL/L (ref 136–145)
SP GR UR: 1.02 (ref 1–1.03)
URINALYSIS REFLEX: ABNORMAL
UROBILINOGEN UR STRIP-MCNC: 0.2 MG/DL (ref 0.2–1)
WBC #/AREA URNS HPF: ABNORMAL /HPF (ref 0–5)

## 2021-03-09 DIAGNOSIS — Z23 IMMUNIZATION DUE: ICD-10-CM

## 2021-08-09 DIAGNOSIS — I10 ESSENTIAL HYPERTENSION: ICD-10-CM

## 2021-08-09 RX ORDER — AMLODIPINE BESYLATE AND BENAZEPRIL HYDROCHLORIDE 10; 20 MG/1; MG/1
CAPSULE ORAL
Qty: 90 CAPSULE | Refills: 1 | Status: SHIPPED | OUTPATIENT
Start: 2021-08-09 | End: 2021-12-03

## 2021-11-05 ENCOUNTER — OFFICE VISIT (OUTPATIENT)
Dept: INTERNAL MEDICINE | Facility: CLINIC | Age: 76
End: 2021-11-05

## 2021-11-05 VITALS
DIASTOLIC BLOOD PRESSURE: 80 MMHG | BODY MASS INDEX: 23.7 KG/M2 | SYSTOLIC BLOOD PRESSURE: 134 MMHG | TEMPERATURE: 97.8 F | WEIGHT: 175 LBS | HEIGHT: 72 IN

## 2021-11-05 DIAGNOSIS — I10 PRIMARY HYPERTENSION: Chronic | ICD-10-CM

## 2021-11-05 DIAGNOSIS — N28.9 MILD RENAL INSUFFICIENCY: ICD-10-CM

## 2021-11-05 DIAGNOSIS — Z00.00 MEDICARE ANNUAL WELLNESS VISIT, SUBSEQUENT: Primary | ICD-10-CM

## 2021-11-05 DIAGNOSIS — E78.49 OTHER HYPERLIPIDEMIA: Chronic | ICD-10-CM

## 2021-11-05 PROBLEM — A49.8 CLOSTRIDIUM DIFFICILE INFECTION: Status: RESOLVED | Noted: 2019-10-08 | Resolved: 2021-11-05

## 2021-11-05 PROCEDURE — 99213 OFFICE O/P EST LOW 20 MIN: CPT | Performed by: INTERNAL MEDICINE

## 2021-11-05 PROCEDURE — 1170F FXNL STATUS ASSESSED: CPT | Performed by: INTERNAL MEDICINE

## 2021-11-05 PROCEDURE — G0439 PPPS, SUBSEQ VISIT: HCPCS | Performed by: INTERNAL MEDICINE

## 2021-11-05 PROCEDURE — 1159F MED LIST DOCD IN RCRD: CPT | Performed by: INTERNAL MEDICINE

## 2021-11-05 RX ORDER — AMLODIPINE BESYLATE 10 MG/1
10 TABLET ORAL DAILY
Qty: 30 TABLET | Refills: 2 | Status: SHIPPED | OUTPATIENT
Start: 2021-11-05 | End: 2022-01-31

## 2021-11-05 NOTE — PROGRESS NOTES
The ABCs of the Annual Wellness Visit  Subsequent Medicare Wellness Visit    Chief Complaint   Patient presents with   • Medicare Wellness-subsequent      Subjective    History of Present Illness:  Chacorta Langston is a 76 y.o. male who presents for a Subsequent Medicare Wellness Visit.  He denies chest pain, dyspnea, hematuria or dysuria. He sees derm regularly. He is not using NSAIDS. He sees Dr. Rayray Velázquez for his PSA; it is in the 5s. There is no FH of kidney disease. He has been on Lotrel for many years.  The following portions of the patient's history were reviewed and   updated as appropriate: allergies, current medications, past family history, past medical history, past social history, past surgical history and problem list.    Compared to one year ago, the patient feels his physical   health is the same.    Compared to one year ago, the patient feels his mental   health is the same.    Recent Hospitalizations:  He was not admitted to the hospital during the last year.       Current Medical Providers:  Patient Care Team:  Baron Colón MD as PCP - General (Internal Medicine)    Outpatient Medications Prior to Visit   Medication Sig Dispense Refill   • amLODIPine-benazepril (LOTREL) 10-20 MG per capsule TAKE ONE CAPSULE BY MOUTH DAILY 90 capsule 1   • ascorbic acid (VITAMIN C) 500 MG tablet Take 500 mg by mouth Daily.     • BIOTIN PO Take  by mouth.     • calcium carbonate-cholecalciferol (CALCIUM 500 +D) 500-400 MG-UNIT tablet tablet Take  by mouth Daily.     • saw palmetto 160 MG capsule Take 450 mg by mouth 4 (Four) Times a Day.     • calcium polycarbophil (FIBERCON) 625 MG tablet Take 625 mg by mouth Daily.     • saccharomyces boulardii (FLORASTOR) 250 MG capsule Take 250 mg by mouth Daily.       No facility-administered medications prior to visit.       No opioid medication identified on active medication list. I have reviewed chart for other potential  high risk medication/s and harmful drug  "interactions in the elderly.          Aspirin is not on active medication list.  Aspirin use is not indicated based on review of current medical condition/s. Risk of harm outweighs potential benefits.  .    Patient Active Problem List   Diagnosis   • Basal cell cancer   • DDD (degenerative disc disease), lumbosacral   • Other hyperlipidemia   • BP (high blood pressure)   • Acute gastric ulcer with hemorrhage   • Schatzki's ring of distal esophagus   • Nocturia   • Diverticulosis   • Iron deficiency anemia   • Mild renal insufficiency     Advance Care Planning  Advance Directive is not on file.  ACP discussion was held with the patient during this visit. Patient does not have an advance directive, declines further assistance.          Objective    Vitals:    11/05/21 1551   BP: 134/80   Temp: 97.8 °F (36.6 °C)   Weight: 79.4 kg (175 lb)   Height: 182.9 cm (72\")     BMI Readings from Last 1 Encounters:   11/05/21 23.73 kg/m²   BMI is within normal parameters. No follow-up required.    Does the patient have evidence of cognitive impairment? No    Physical Exam  Vitals reviewed.   Constitutional:       Appearance: He is well-developed.   HENT:      Head: Normocephalic and atraumatic.   Cardiovascular:      Rate and Rhythm: Normal rate and regular rhythm.      Heart sounds: Normal heart sounds, S1 normal and S2 normal.   Pulmonary:      Effort: Pulmonary effort is normal.      Breath sounds: Normal breath sounds.   Abdominal:      Palpations: Abdomen is soft. There is no mass.      Tenderness: There is no abdominal tenderness.   Skin:     General: Skin is warm.   Neurological:      Mental Status: He is alert.   Psychiatric:         Behavior: Behavior normal.       Lab Results   Component Value Date    CHLPL 195 10/29/2021    TRIG 61 10/29/2021    HDL 50 10/29/2021     (H) 10/29/2021    VLDL 11 10/29/2021            HEALTH RISK ASSESSMENT    Smoking Status:  Social History     Tobacco Use   Smoking Status Never " Smoker   Smokeless Tobacco Never Used     Alcohol Consumption:  Social History     Substance and Sexual Activity   Alcohol Use Yes    Comment: rarely     Fall Risk Screen:    MARVIN Fall Risk Assessment was completed, and patient is at LOW risk for falls.Assessment completed on:11/5/2021    Depression Screening:  PHQ-2/PHQ-9 Depression Screening 11/5/2021   Little interest or pleasure in doing things 0   Feeling down, depressed, or hopeless 0   Total Score 0       Health Habits and Functional and Cognitive Screening:  Functional & Cognitive Status 11/5/2021   Do you have difficulty preparing food and eating? No   Do you have difficulty bathing yourself, getting dressed or grooming yourself? No   Do you have difficulty using the toilet? No   Do you have difficulty moving around from place to place? No   Do you have trouble with steps or getting out of a bed or a chair? No   Current Diet Unhealthy Diet   Dental Exam Up to date   Eye Exam Up to date   Exercise (times per week) 3 times per week   Current Exercises Include Walking   Current Exercise Activities Include -   Do you need help using the phone?  No   Are you deaf or do you have serious difficulty hearing?  No   Do you need help with transportation? No   Do you need help shopping? No   Do you need help preparing meals?  No   Do you need help with housework?  No   Do you need help with laundry? No   Do you need help taking your medications? No   Do you need help managing money? No   Do you ever drive or ride in a car without wearing a seat belt? No   Have you felt unusual stress, anger or loneliness in the last month? No   Who do you live with? Spouse   If you need help, do you have trouble finding someone available to you? No   Have you been bothered in the last four weeks by sexual problems? No   Do you have difficulty concentrating, remembering or making decisions? No       Age-appropriate Screening Schedule:  Refer to the list below for future screening  recommendations based on patient's age, sex and/or medical conditions. Orders for these recommended tests are listed in the plan section. The patient has been provided with a written plan.    Health Maintenance   Topic Date Due   • INFLUENZA VACCINE  01/01/2032 (Originally 8/1/2021)   • ZOSTER VACCINE (1 of 2) 03/05/2050 (Originally 6/15/1995)   • LIPID PANEL  10/29/2022   • TDAP/TD VACCINES (2 - Tdap) 03/19/2029              Assessment/Plan   CMS Preventative Services Quick Reference  Risk Factors Identified During Encounter  Immunizations Discussed/Encouraged (specific Immunizations; Influenza, Pneumococcal 23, Shingrix and COVID19  The above risks/problems have been discussed with the patient.  Follow up actions/plans if indicated are seen below in the Assessment/Plan Section.  Pertinent information has been shared with the patient in the After Visit Summary.    Diagnoses and all orders for this visit:    1. Medicare annual wellness visit, subsequent (Primary)    2. Primary hypertension  -     amLODIPine (NORVASC) 10 MG tablet; Take 1 tablet by mouth Daily.  Dispense: 30 tablet; Refill: 2    3. Other hyperlipidemia    4. Mild renal insufficiency  -     Urinalysis With Culture If Indicated -; Future  -     Protein Elec + Interp, Serum; Future  -     Protein Electrophoresis, Random Urine - Urine, Clean Catch; Future  -     US Renal Bilateral; Future  -     Basic Metabolic Panel; Future        Follow Up:   Return in about 4 weeks (around 12/3/2021) for Lab Before FUP.     An After Visit Summary and PPPS were made available to the patient.                 Recc JNJ booster. He declines flu, pneumovax or Shingrix. Reviewed labs. Stop benazepril. Get renal USN.  He will start checking his BP daily. Discussed ACE-I protect the kidneys but let's see how he does in one month off of them.

## 2021-11-29 ENCOUNTER — LAB (OUTPATIENT)
Dept: INTERNAL MEDICINE | Facility: CLINIC | Age: 76
End: 2021-11-29

## 2021-12-01 ENCOUNTER — HOSPITAL ENCOUNTER (OUTPATIENT)
Dept: ULTRASOUND IMAGING | Facility: HOSPITAL | Age: 76
Discharge: HOME | End: 2021-12-01
Admitting: INTERNAL MEDICINE

## 2021-12-01 DIAGNOSIS — N28.9 MILD RENAL INSUFFICIENCY: ICD-10-CM

## 2021-12-01 PROCEDURE — 76775 US EXAM ABDO BACK WALL LIM: CPT

## 2021-12-03 ENCOUNTER — OFFICE VISIT (OUTPATIENT)
Dept: INTERNAL MEDICINE | Facility: CLINIC | Age: 76
End: 2021-12-03

## 2021-12-03 VITALS
SYSTOLIC BLOOD PRESSURE: 140 MMHG | TEMPERATURE: 97.3 F | WEIGHT: 171 LBS | DIASTOLIC BLOOD PRESSURE: 82 MMHG | BODY MASS INDEX: 23.16 KG/M2 | HEIGHT: 72 IN

## 2021-12-03 DIAGNOSIS — I49.3 PVC (PREMATURE VENTRICULAR CONTRACTION): ICD-10-CM

## 2021-12-03 DIAGNOSIS — I10 PRIMARY HYPERTENSION: Primary | ICD-10-CM

## 2021-12-03 PROCEDURE — 93000 ELECTROCARDIOGRAM COMPLETE: CPT | Performed by: INTERNAL MEDICINE

## 2021-12-03 PROCEDURE — 99214 OFFICE O/P EST MOD 30 MIN: CPT | Performed by: INTERNAL MEDICINE

## 2021-12-03 RX ORDER — HYDROCHLOROTHIAZIDE 12.5 MG/1
12.5 TABLET ORAL DAILY
Qty: 30 TABLET | Refills: 2 | Status: SHIPPED | OUTPATIENT
Start: 2021-12-03 | End: 2022-02-11 | Stop reason: SDUPTHER

## 2021-12-03 NOTE — PROGRESS NOTES
Subjective        Chief Complaint   Patient presents with   • Hypertension           Chacorta Langston is a 76 y.o. male who presents for    Patient Active Problem List   Diagnosis   • Basal cell cancer   • DDD (degenerative disc disease), lumbosacral   • Other hyperlipidemia   • Acute gastric ulcer with hemorrhage   • Schatzki's ring of distal esophagus   • Nocturia   • Diverticulosis   • Iron deficiency anemia   • Mild renal insufficiency   • Primary hypertension       History of Present Illness     He denies chest pain or dyspnea. His -153/71-99 with HR from 68-76. He does not regularly feel skipped heart beats. He does not drink caffeine or use sudafed.  Allergies   Allergen Reactions   • Clindamycin/Lincomycin Rash   • Ace Inhibitors Other (See Comments)     Renal insufficiency   • Beta Adrenergic Blockers Unknown (See Comments)     'COLD FEET'   • Penicillins Rash       Current Outpatient Medications on File Prior to Visit   Medication Sig Dispense Refill   • amLODIPine (NORVASC) 10 MG tablet Take 1 tablet by mouth Daily. 30 tablet 2   • ascorbic acid (VITAMIN C) 500 MG tablet Take 500 mg by mouth Daily.     • BIOTIN PO Take  by mouth.     • calcium carbonate-cholecalciferol (CALCIUM 500 +D) 500-400 MG-UNIT tablet tablet Take  by mouth Daily.     • saw palmetto 160 MG capsule Take 450 mg by mouth 4 (Four) Times a Day.     • [DISCONTINUED] amLODIPine-benazepril (LOTREL) 10-20 MG per capsule TAKE ONE CAPSULE BY MOUTH DAILY 90 capsule 1     No current facility-administered medications on file prior to visit.       Past Medical History:   Diagnosis Date   • Clostridium difficile infection 10/8/2019       Past Surgical History:   Procedure Laterality Date   • COLONOSCOPY N/A 12/7/2018    Procedure: COLONOSCOPY to cecum and terminal ileum;  Surgeon: Maxx Tavarez MD;  Location: [facility] ENDOSCOPY;  Service: Gastroenterology   • CYST REMOVAL     • ENDOSCOPY N/A 12/7/2018    Procedure: ESOPHAGOGASTRODUODENOSCOPY  "with biopsies, resolution clip X 1 placed;  Surgeon: Maxx Tavarez MD;  Location: [facility] ENDOSCOPY;  Service: Gastroenterology   • ENDOSCOPY N/A 3/8/2019    Procedure: ESOPHAGOGASTRODUODENOSCOPY;  Surgeon: Maxx Tavarez MD;  Location: [facility] ENDOSCOPY;  Service: Gastroenterology       Family History   Problem Relation Age of Onset   • COPD Mother    • Hypertension Mother    • Stroke Mother    • Osteoporosis Mother    • COPD Father    • Pneumonia Father        Social History     Socioeconomic History   • Marital status:    Tobacco Use   • Smoking status: Never Smoker   • Smokeless tobacco: Never Used   Substance and Sexual Activity   • Alcohol use: Yes     Comment: rarely   • Drug use: No   • Sexual activity: Defer           The following portions of the patient's history were reviewed and updated as appropriate: problem list, allergies, current medications, past medical history, past family history, past social history and past surgical history.    Review of Systems    Immunization History   Administered Date(s) Administered   • COVID-19 (PATRICE) 04/10/2021, 11/17/2021   • TD Preservative Free 03/19/2019       Objective   Vitals:    12/03/21 1604   BP: 140/82   Temp: 97.3 °F (36.3 °C)   Weight: 77.6 kg (171 lb)   Height: 182.9 cm (72.01\")     Body mass index is 23.19 kg/m².  Physical Exam  Vitals reviewed.   Constitutional:       Appearance: He is well-developed.   HENT:      Head: Normocephalic and atraumatic.   Cardiovascular:      Rate and Rhythm: Normal rate and regular rhythm. Occasional extrasystoles are present.     Heart sounds: Normal heart sounds, S1 normal and S2 normal.   Pulmonary:      Effort: Pulmonary effort is normal.      Breath sounds: Normal breath sounds.   Skin:     General: Skin is warm.   Neurological:      Mental Status: He is alert.   Psychiatric:         Behavior: Behavior normal.           ECG 12 Lead    Date/Time: 12/3/2021 5:17 PM  Performed by: Baron Colón" MD  Authorized by: Baron Colón MD   Comparison: not compared with previous ECG   Rhythm: sinus rhythm  Ectopy: infrequent PVCs  Rate: normal  Conduction: 1st degree AV block  Q waves: III    QRS axis: normal    Clinical impression: abnormal EKG            Assessment/Plan   Diagnoses and all orders for this visit:    1. Primary hypertension (Primary)  -     hydroCHLOROthiazide (HYDRODIURIL) 12.5 MG tablet; Take 1 tablet by mouth Daily.  Dispense: 30 tablet; Refill: 2  -     Basic Metabolic Panel; Future  -     TSH; Future    2. PVC (premature ventricular contraction)  -     CBC & Differential; Future  -     TSH; Future  -     Magnesium; Future  -     Holter monitor - 24 hour; Future  -     ECG 12 Lead               Bmp and renal USN are nl. Cr normalized off ACE. Add HCTZ. Eval PVCs.  Return in about 4 weeks (around 12/31/2021).

## 2021-12-22 ENCOUNTER — HOSPITAL ENCOUNTER (OUTPATIENT)
Dept: CARDIOLOGY | Facility: HOSPITAL | Age: 76
Discharge: HOME | End: 2021-12-22
Admitting: INTERNAL MEDICINE

## 2021-12-22 DIAGNOSIS — I49.3 PVC (PREMATURE VENTRICULAR CONTRACTION): ICD-10-CM

## 2021-12-22 PROCEDURE — 93225 XTRNL ECG REC<48 HRS REC: CPT

## 2021-12-22 PROCEDURE — 93226 XTRNL ECG REC<48 HR SCAN A/R: CPT

## 2021-12-27 DIAGNOSIS — I49.3 PVC (PREMATURE VENTRICULAR CONTRACTION): Primary | ICD-10-CM

## 2021-12-27 LAB
MAXIMAL PREDICTED HEART RATE: 144 BPM
STRESS TARGET HR: 122 BPM

## 2021-12-27 PROCEDURE — 93227 XTRNL ECG REC<48 HR R&I: CPT | Performed by: INTERNAL MEDICINE

## 2022-01-26 ENCOUNTER — HOSPITAL ENCOUNTER (OUTPATIENT)
Dept: CARDIOLOGY | Facility: HOSPITAL | Age: 77
Discharge: HOME | End: 2022-01-26
Admitting: INTERNAL MEDICINE

## 2022-01-26 VITALS
SYSTOLIC BLOOD PRESSURE: 140 MMHG | WEIGHT: 171 LBS | HEART RATE: 72 BPM | DIASTOLIC BLOOD PRESSURE: 80 MMHG | BODY MASS INDEX: 23.16 KG/M2 | HEIGHT: 72 IN

## 2022-01-26 DIAGNOSIS — I49.3 PVC (PREMATURE VENTRICULAR CONTRACTION): ICD-10-CM

## 2022-01-26 PROCEDURE — 93306 TTE W/DOPPLER COMPLETE: CPT

## 2022-01-26 PROCEDURE — 93306 TTE W/DOPPLER COMPLETE: CPT | Performed by: INTERNAL MEDICINE

## 2022-01-27 ENCOUNTER — OFFICE VISIT (OUTPATIENT)
Dept: CARDIOLOGY | Facility: CLINIC | Age: 77
End: 2022-01-27

## 2022-01-27 VITALS
SYSTOLIC BLOOD PRESSURE: 128 MMHG | BODY MASS INDEX: 23.57 KG/M2 | DIASTOLIC BLOOD PRESSURE: 72 MMHG | HEIGHT: 72 IN | WEIGHT: 174 LBS | HEART RATE: 75 BPM | OXYGEN SATURATION: 99 %

## 2022-01-27 DIAGNOSIS — R94.31 ABNORMAL ELECTROCARDIOGRAM (ECG) (EKG): ICD-10-CM

## 2022-01-27 DIAGNOSIS — I49.3 PVC'S (PREMATURE VENTRICULAR CONTRACTIONS): Primary | ICD-10-CM

## 2022-01-27 LAB
AORTIC ARCH: 3 CM
ASCENDING AORTA: 3.6 CM
BH CV ECHO MEAS - ACS: 2 CM
BH CV ECHO MEAS - AI DEC SLOPE: 93.2 CM/SEC^2
BH CV ECHO MEAS - AI MAX PG: 26.9 MMHG
BH CV ECHO MEAS - AI MAX VEL: 259.4 CM/SEC
BH CV ECHO MEAS - AI P1/2T: 815.3 MSEC
BH CV ECHO MEAS - AO MAX PG (FULL): 2.5 MMHG
BH CV ECHO MEAS - AO MAX PG: 7.4 MMHG
BH CV ECHO MEAS - AO MEAN PG (FULL): 1.8 MMHG
BH CV ECHO MEAS - AO MEAN PG: 4.1 MMHG
BH CV ECHO MEAS - AO ROOT AREA (BSA CORRECTED): 2
BH CV ECHO MEAS - AO ROOT AREA: 13.1 CM^2
BH CV ECHO MEAS - AO ROOT DIAM: 4.1 CM
BH CV ECHO MEAS - AO V2 MAX: 135.7 CM/SEC
BH CV ECHO MEAS - AO V2 MEAN: 94.6 CM/SEC
BH CV ECHO MEAS - AO V2 VTI: 33 CM
BH CV ECHO MEAS - ASC AORTA: 3.6 CM
BH CV ECHO MEAS - AVA(I,A): 3 CM^2
BH CV ECHO MEAS - AVA(I,D): 3 CM^2
BH CV ECHO MEAS - AVA(V,A): 3.6 CM^2
BH CV ECHO MEAS - AVA(V,D): 3.6 CM^2
BH CV ECHO MEAS - BSA(HAYCOCK): 2 M^2
BH CV ECHO MEAS - BSA: 2 M^2
BH CV ECHO MEAS - BZI_BMI: 23.2 KILOGRAMS/M^2
BH CV ECHO MEAS - BZI_METRIC_HEIGHT: 182.9 CM
BH CV ECHO MEAS - BZI_METRIC_WEIGHT: 77.6 KG
BH CV ECHO MEAS - EDV(CUBED): 116.2 ML
BH CV ECHO MEAS - EDV(MOD-SP2): 74 ML
BH CV ECHO MEAS - EDV(MOD-SP4): 72 ML
BH CV ECHO MEAS - EDV(TEICH): 111.7 ML
BH CV ECHO MEAS - EF(CUBED): 80 %
BH CV ECHO MEAS - EF(MOD-BP): 55.6 %
BH CV ECHO MEAS - EF(MOD-SP2): 52.7 %
BH CV ECHO MEAS - EF(MOD-SP4): 55.6 %
BH CV ECHO MEAS - EF(TEICH): 72.3 %
BH CV ECHO MEAS - ESV(CUBED): 23.2 ML
BH CV ECHO MEAS - ESV(MOD-SP2): 35 ML
BH CV ECHO MEAS - ESV(MOD-SP4): 32 ML
BH CV ECHO MEAS - ESV(TEICH): 31 ML
BH CV ECHO MEAS - FS: 41.5 %
BH CV ECHO MEAS - IVS/LVPW: 1
BH CV ECHO MEAS - IVSD: 1.1 CM
BH CV ECHO MEAS - LAT PEAK E' VEL: 10.6 CM/SEC
BH CV ECHO MEAS - LV DIASTOLIC VOL/BSA (35-75): 36.1 ML/M^2
BH CV ECHO MEAS - LV MASS(C)D: 193.2 GRAMS
BH CV ECHO MEAS - LV MASS(C)DI: 96.9 GRAMS/M^2
BH CV ECHO MEAS - LV MAX PG: 4.8 MMHG
BH CV ECHO MEAS - LV MEAN PG: 2.3 MMHG
BH CV ECHO MEAS - LV SYSTOLIC VOL/BSA (12-30): 16.1 ML/M^2
BH CV ECHO MEAS - LV V1 MAX: 109.9 CM/SEC
BH CV ECHO MEAS - LV V1 MEAN: 67.3 CM/SEC
BH CV ECHO MEAS - LV V1 VTI: 22.2 CM
BH CV ECHO MEAS - LVIDD: 4.9 CM
BH CV ECHO MEAS - LVIDS: 2.9 CM
BH CV ECHO MEAS - LVLD AP2: 7.8 CM
BH CV ECHO MEAS - LVLD AP4: 6.9 CM
BH CV ECHO MEAS - LVLS AP2: 7.1 CM
BH CV ECHO MEAS - LVLS AP4: 6.8 CM
BH CV ECHO MEAS - LVOT AREA (M): 4.5 CM^2
BH CV ECHO MEAS - LVOT AREA: 4.4 CM^2
BH CV ECHO MEAS - LVOT DIAM: 2.4 CM
BH CV ECHO MEAS - LVPWD: 1.1 CM
BH CV ECHO MEAS - MED PEAK E' VEL: 8.2 CM/SEC
BH CV ECHO MEAS - MV A DUR: 0.1 SEC
BH CV ECHO MEAS - MV A MAX VEL: 84.4 CM/SEC
BH CV ECHO MEAS - MV DEC SLOPE: 288.8 CM/SEC^2
BH CV ECHO MEAS - MV DEC TIME: 0.24 SEC
BH CV ECHO MEAS - MV E MAX VEL: 50.6 CM/SEC
BH CV ECHO MEAS - MV E/A: 0.6
BH CV ECHO MEAS - MV MAX PG: 4.3 MMHG
BH CV ECHO MEAS - MV MEAN PG: 1.5 MMHG
BH CV ECHO MEAS - MV P1/2T MAX VEL: 74.9 CM/SEC
BH CV ECHO MEAS - MV P1/2T: 76 MSEC
BH CV ECHO MEAS - MV V2 MAX: 103.2 CM/SEC
BH CV ECHO MEAS - MV V2 MEAN: 56.3 CM/SEC
BH CV ECHO MEAS - MV V2 VTI: 22.6 CM
BH CV ECHO MEAS - MVA P1/2T LCG: 2.9 CM^2
BH CV ECHO MEAS - MVA(P1/2T): 2.9 CM^2
BH CV ECHO MEAS - MVA(VTI): 4.4 CM^2
BH CV ECHO MEAS - PA MAX PG (FULL): 2.9 MMHG
BH CV ECHO MEAS - PA MAX PG: 4.6 MMHG
BH CV ECHO MEAS - PA V2 MAX: 107.2 CM/SEC
BH CV ECHO MEAS - PULM A REVS DUR: 0.1 SEC
BH CV ECHO MEAS - PULM A REVS VEL: 41.6 CM/SEC
BH CV ECHO MEAS - PULM DIAS VEL: 39.4 CM/SEC
BH CV ECHO MEAS - PULM S/D: 1.1
BH CV ECHO MEAS - PULM SYS VEL: 44.1 CM/SEC
BH CV ECHO MEAS - PVA(V,A): 1.9 CM^2
BH CV ECHO MEAS - PVA(V,D): 1.9 CM^2
BH CV ECHO MEAS - QP/QS: 0.48
BH CV ECHO MEAS - RV MAX PG: 1.7 MMHG
BH CV ECHO MEAS - RV MEAN PG: 1.2 MMHG
BH CV ECHO MEAS - RV V1 MAX: 65.6 CM/SEC
BH CV ECHO MEAS - RV V1 MEAN: 53.5 CM/SEC
BH CV ECHO MEAS - RV V1 VTI: 15.2 CM
BH CV ECHO MEAS - RVOT AREA: 3.1 CM^2
BH CV ECHO MEAS - RVOT DIAM: 2 CM
BH CV ECHO MEAS - SI(AO): 216.1 ML/M^2
BH CV ECHO MEAS - SI(CUBED): 46.6 ML/M^2
BH CV ECHO MEAS - SI(LVOT): 49.5 ML/M^2
BH CV ECHO MEAS - SI(MOD-SP2): 19.6 ML/M^2
BH CV ECHO MEAS - SI(MOD-SP4): 20.1 ML/M^2
BH CV ECHO MEAS - SI(TEICH): 40.5 ML/M^2
BH CV ECHO MEAS - SUP REN AO DIAM: 2 CM
BH CV ECHO MEAS - SV(AO): 430.8 ML
BH CV ECHO MEAS - SV(CUBED): 92.9 ML
BH CV ECHO MEAS - SV(LVOT): 98.7 ML
BH CV ECHO MEAS - SV(MOD-SP2): 39 ML
BH CV ECHO MEAS - SV(MOD-SP4): 40 ML
BH CV ECHO MEAS - SV(RVOT): 47.2 ML
BH CV ECHO MEAS - SV(TEICH): 80.8 ML
BH CV ECHO MEAS - TAPSE (>1.6): 2.6 CM
BH CV ECHO MEASUREMENTS AVERAGE E/E' RATIO: 5.38
BH CV VAS BP RIGHT ARM: NORMAL MMHG
BH CV XLRA - RV BASE: 3.6 CM
BH CV XLRA - RV LENGTH: 7.4 CM
BH CV XLRA - RV MID: 2.8 CM
BH CV XLRA - TDI S': 14.3 CM/SEC
LEFT ATRIUM VOLUME INDEX: 16 ML/M2
LV EF 2D ECHO EST: 56 %
MAXIMAL PREDICTED HEART RATE: 144 BPM
SINUS: 3.7 CM
STJ: 3.3 CM
STRESS TARGET HR: 122 BPM

## 2022-01-27 PROCEDURE — 99204 OFFICE O/P NEW MOD 45 MIN: CPT | Performed by: INTERNAL MEDICINE

## 2022-01-29 DIAGNOSIS — I10 PRIMARY HYPERTENSION: Chronic | ICD-10-CM

## 2022-01-31 RX ORDER — AMLODIPINE BESYLATE 10 MG/1
TABLET ORAL
Qty: 30 TABLET | Refills: 2 | Status: SHIPPED | OUTPATIENT
Start: 2022-01-31 | End: 2022-02-11 | Stop reason: SDUPTHER

## 2022-02-07 NOTE — PROGRESS NOTES
Date of Office Visit:  2022  Encounter Provider: Dell Nguyen MD  Place of Service: [facility] CARDIOLOGY  Patient Name: Chacorta Langston  :1945    Chief complaint: Frequent PVCs.    History of Present Illness:    Dear Johny:    I had the pleasure of seeing your patient in cardiology office on 2022.  He is a very pleasant 76-year-old male with a history of hypertension who presents for evaluation of PVCs.    The patient was found to have ectopic beats on exam and on an EKG from 12/3/2021.  He subsequently wore a 24-hour Holter monitor on 2021, which showed very frequent PVCs.  In fact, his total PVC burden was 39%.  He had frequent bigeminy and trigeminy, as well as intermittent ventricular couplets and triplets.  He is largely asymptomatic, and really does not feel them.  He feels that he has had these for several years.  He has had no chest pain, shortness of breath, syncope, or near syncope.  He also had an echocardiogram performed on 2022 which showed an ejection fraction of 56%, grade 1 diastolic dysfunction, and mild to moderate aortic insufficiency.  He did tell me that he had one of his toes get very cold with beta-blockers in the past, and he felt that this was impairing the circulation.  He also has a brother who had a CABG at age 79.    Past Medical History:   Diagnosis Date   • Clostridium difficile infection 10/8/2019   • Hypertension        Past Surgical History:   Procedure Laterality Date   • COLONOSCOPY N/A 2018    Procedure: COLONOSCOPY to cecum and terminal ileum;  Surgeon: Maxx Tavarez MD;  Location: [facility] ENDOSCOPY;  Service: Gastroenterology   • CYST REMOVAL     • ENDOSCOPY N/A 2018    Procedure: ESOPHAGOGASTRODUODENOSCOPY with biopsies, resolution clip X 1 placed;  Surgeon: Maxx Tavarez MD;  Location: [facility] ENDOSCOPY;  Service: Gastroenterology   • ENDOSCOPY N/A 3/8/2019    Procedure:  "ESOPHAGOGASTRODUODENOSCOPY;  Surgeon: Maxx Tavarez MD;  Location: [facility] ENDOSCOPY;  Service: Gastroenterology       Current Outpatient Medications on File Prior to Visit   Medication Sig Dispense Refill   • ascorbic acid (VITAMIN C) 500 MG tablet Take 500 mg by mouth Daily.     • BIOTIN PO Take  by mouth.     • calcium carbonate-cholecalciferol (CALCIUM 500 +D) 500-400 MG-UNIT tablet tablet Take  by mouth Daily.     • hydroCHLOROthiazide (HYDRODIURIL) 12.5 MG tablet Take 1 tablet by mouth Daily. 30 tablet 2   • saw palmetto 160 MG capsule Take 450 mg by mouth 4 (Four) Times a Day.       No current facility-administered medications on file prior to visit.     Allergies as of 01/27/2022 - Reviewed 01/27/2022   Allergen Reaction Noted   • Clindamycin/lincomycin Rash 03/27/2019   • Ace inhibitors Other (See Comments) 12/03/2021   • Beta adrenergic blockers Unknown (See Comments) 03/11/2016   • Penicillins Rash 03/11/2016     Social History     Socioeconomic History   • Marital status:    Tobacco Use   • Smoking status: Never Smoker   • Smokeless tobacco: Never Used   Substance and Sexual Activity   • Alcohol use: Yes     Comment: rarely   • Drug use: No   • Sexual activity: Defer     Family History   Problem Relation Age of Onset   • COPD Mother    • Hypertension Mother    • Stroke Mother    • Osteoporosis Mother    • COPD Father    • Pneumonia Father        Review of Systems   All other systems reviewed and are negative.     Objective:     Vitals:    01/27/22 0932   BP: 128/72   Pulse: 75   SpO2: 99%   Weight: 78.9 kg (174 lb)   Height: 182.9 cm (72.01\")     Body mass index is 23.59 kg/m².    Constitutional:       Appearance: Healthy appearance. Well-developed.   Eyes:      Conjunctiva/sclera: Conjunctivae normal.   HENT:      Head: Normocephalic and atraumatic.   Pulmonary:      Effort: Pulmonary effort is normal.      Breath sounds: Normal breath sounds.   Cardiovascular:      Normal rate. Occasional " ectopic beats. Regular rhythm.      Murmurs: There is no murmur.      No gallop.   Edema:     Peripheral edema absent.   Abdominal:      Palpations: Abdomen is soft.      Tenderness: There is no abdominal tenderness.   Musculoskeletal:      Cervical back: Neck supple. Skin:     General: Skin is warm.   Neurological:      Mental Status: Alert and oriented to person, place, and time.   Psychiatric:         Behavior: Behavior normal.       Lab Review:   Procedures    Lipid Panel    Lipid Panel 3/2/21 10/29/21   Total Cholesterol 219 (A) 195   Triglycerides 77 61   HDL Cholesterol 49 50   VLDL Cholesterol 14 11   LDL Cholesterol  156 (A) 134 (A)   (A) Abnormal value       Comments are available for some flowsheets but are not being displayed.              Cardiac Procedures:  1.  24-hour Holter monitor on 12/22/2021: There were frequent PVCs with a total burden of 39%.  There were frequent episodes of bigeminy and trigeminy.  There were intermittent couplets and triplets.  The average heart rate was 66 bpm.  2.  Echocardiogram on 1/26/2022: The ejection fraction was 56%.  There was grade 1 diastolic dysfunction.  There was mild to moderate aortic insufficiency.  The ascending aorta was borderline dilated at 3.6 cm.    Assessment:       Diagnosis Plan   1. PVC's (premature ventricular contractions)  Treadmill Stress Test   2. Abnormal electrocardiogram (ECG) (EKG)   Treadmill Stress Test     Plan:       Again, he is completely asymptomatic.  However, his PVC burden is very high at 39%.  He also had frequent bigeminy and trigeminy, as well as intermittent ventricular couplets and triplets.  He did not have any ventricular tachycardia above 3 beats.  He has not had any syncope or near syncope, and really does not feel palpitations.  He does not have a PVC induced cardiomyopathy as his ejection fraction was 56% on the recent echo.  I verified this by looking at the images myself.      With this type of burden and with a  family history of coronary artery disease, I want to make sure that there is not an ischemic component to this (although unlikely).  I have recommended an exercise treadmill stress test at this point.  I also want to make sure he does not have exercise-induced ventricular tachycardia.  I suspect this is a right ventricular focus which is causing a high PVC burden.  He will need periodic echocardiograms in the future.  We discussed beta-blockers, and he told me that one of his toes got very cold with beta-blockers in the past and he felt like it was impairing his circulation.  He would be willing to try these again, and I may place him on a very low-dose depending on the stress test results.

## 2022-02-09 ENCOUNTER — HOSPITAL ENCOUNTER (OUTPATIENT)
Dept: CARDIOLOGY | Facility: HOSPITAL | Age: 77
Discharge: HOME | End: 2022-02-09
Admitting: INTERNAL MEDICINE

## 2022-02-09 DIAGNOSIS — I49.3 PVC'S (PREMATURE VENTRICULAR CONTRACTIONS): ICD-10-CM

## 2022-02-09 DIAGNOSIS — R94.31 ABNORMAL ELECTROCARDIOGRAM (ECG) (EKG): ICD-10-CM

## 2022-02-09 LAB
BH CV STRESS BP STAGE 1: NORMAL
BH CV STRESS BP STAGE 2: NORMAL
BH CV STRESS BP STAGE 3: NORMAL
BH CV STRESS DURATION MIN STAGE 1: 3
BH CV STRESS DURATION MIN STAGE 2: 3
BH CV STRESS DURATION MIN STAGE 3: 3
BH CV STRESS DURATION SEC STAGE 1: 0
BH CV STRESS DURATION SEC STAGE 2: 0
BH CV STRESS DURATION SEC STAGE 3: 0
BH CV STRESS GRADE STAGE 1: 10
BH CV STRESS GRADE STAGE 2: 12
BH CV STRESS GRADE STAGE 3: 14
BH CV STRESS HR STAGE 1: 104
BH CV STRESS HR STAGE 2: 119
BH CV STRESS HR STAGE 3: 136
BH CV STRESS METS STAGE 1: 5
BH CV STRESS METS STAGE 2: 7.5
BH CV STRESS METS STAGE 3: 10
BH CV STRESS PROTOCOL 1: NORMAL
BH CV STRESS RECOVERY BP: NORMAL MMHG
BH CV STRESS RECOVERY HR: 105 BPM
BH CV STRESS SPEED STAGE 1: 1.7
BH CV STRESS SPEED STAGE 2: 2.5
BH CV STRESS SPEED STAGE 3: 3.4
BH CV STRESS STAGE 1: 1
BH CV STRESS STAGE 2: 2
BH CV STRESS STAGE 3: 3
MAXIMAL PREDICTED HEART RATE: 144 BPM
PERCENT MAX PREDICTED HR: 94.44 %
STRESS BASELINE BP: NORMAL MMHG
STRESS BASELINE HR: 89 BPM
STRESS PERCENT HR: 111 %
STRESS POST ESTIMATED WORKLOAD: 10 METS
STRESS POST EXERCISE DUR MIN: 9 MIN
STRESS POST EXERCISE DUR SEC: 0 SEC
STRESS POST PEAK BP: NORMAL MMHG
STRESS POST PEAK HR: 136 BPM
STRESS TARGET HR: 122 BPM

## 2022-02-09 PROCEDURE — 93016 CV STRESS TEST SUPVJ ONLY: CPT | Performed by: INTERNAL MEDICINE

## 2022-02-09 PROCEDURE — 93018 CV STRESS TEST I&R ONLY: CPT | Performed by: INTERNAL MEDICINE

## 2022-02-09 PROCEDURE — 93017 CV STRESS TEST TRACING ONLY: CPT

## 2022-02-10 NOTE — PROGRESS NOTES
Called and gave the patient the results.  His PVCs actually improved with exertion, which would be expected.  He had no ventricular tachycardia no symptoms.  I am going to treat him medically for now.  I do not feel that he needs beta-blockers at this time because he is asymptomatic.  He will need a yearly echocardiogram because of the burden of PVCs.  I will have him follow-up with me in 6 months.  GM

## 2022-02-11 ENCOUNTER — OFFICE VISIT (OUTPATIENT)
Dept: INTERNAL MEDICINE | Facility: CLINIC | Age: 77
End: 2022-02-11

## 2022-02-11 VITALS
HEIGHT: 72 IN | BODY MASS INDEX: 23.3 KG/M2 | SYSTOLIC BLOOD PRESSURE: 132 MMHG | TEMPERATURE: 97.8 F | DIASTOLIC BLOOD PRESSURE: 76 MMHG | WEIGHT: 172 LBS

## 2022-02-11 DIAGNOSIS — I10 PRIMARY HYPERTENSION: ICD-10-CM

## 2022-02-11 DIAGNOSIS — I49.3 FREQUENT PVCS: Primary | ICD-10-CM

## 2022-02-11 PROBLEM — I35.1 NONRHEUMATIC AORTIC VALVE INSUFFICIENCY: Status: ACTIVE | Noted: 2022-02-11

## 2022-02-11 PROCEDURE — 99213 OFFICE O/P EST LOW 20 MIN: CPT | Performed by: INTERNAL MEDICINE

## 2022-02-11 RX ORDER — AMLODIPINE BESYLATE 10 MG/1
10 TABLET ORAL DAILY
Qty: 90 TABLET | Refills: 2 | Status: SHIPPED | OUTPATIENT
Start: 2022-02-11 | End: 2023-01-23

## 2022-02-11 RX ORDER — HYDROCHLOROTHIAZIDE 12.5 MG/1
12.5 TABLET ORAL DAILY
Qty: 90 TABLET | Refills: 2 | Status: SHIPPED | OUTPATIENT
Start: 2022-02-11 | End: 2022-11-21

## 2022-02-11 NOTE — PROGRESS NOTES
Subjective        Chief Complaint   Patient presents with   • Hypertension           Chacorta Langston is a 76 y.o. male who presents for    Patient Active Problem List   Diagnosis   • Basal cell cancer   • DDD (degenerative disc disease), lumbosacral   • Other hyperlipidemia   • Acute gastric ulcer with hemorrhage   • Schatzki's ring of distal esophagus   • Nocturia   • Diverticulosis   • Iron deficiency anemia   • Mild renal insufficiency   • Primary hypertension   • Frequent PVCs   • Nonrheumatic aortic valve insufficiency       History of Present Illness     He saw Dr. Nguyen for his PVCs. He had a negative stress test. His BP was 114/67 at last check. He denies chest pain or dyspnea.  Allergies   Allergen Reactions   • Clindamycin/Lincomycin Rash   • Ace Inhibitors Other (See Comments)     Renal insufficiency   • Beta Adrenergic Blockers Unknown (See Comments)     'COLD FEET'   • Penicillins Rash       Current Outpatient Medications on File Prior to Visit   Medication Sig Dispense Refill   • ascorbic acid (VITAMIN C) 500 MG tablet Take 500 mg by mouth Daily.     • BIOTIN PO Take  by mouth.     • calcium carbonate-cholecalciferol (CALCIUM 500 +D) 500-400 MG-UNIT tablet tablet Take  by mouth Daily.     • saw palmetto 160 MG capsule Take 450 mg by mouth 4 (Four) Times a Day.     • [DISCONTINUED] amLODIPine (NORVASC) 10 MG tablet TAKE ONE TABLET BY MOUTH DAILY 30 tablet 2   • [DISCONTINUED] hydroCHLOROthiazide (HYDRODIURIL) 12.5 MG tablet Take 1 tablet by mouth Daily. 30 tablet 2     No current facility-administered medications on file prior to visit.       Past Medical History:   Diagnosis Date   • Clostridium difficile infection 10/8/2019   • Hypertension        Past Surgical History:   Procedure Laterality Date   • COLONOSCOPY N/A 12/7/2018    Procedure: COLONOSCOPY to cecum and terminal ileum;  Surgeon: Maxx Tavarez MD;  Location: [facility] ENDOSCOPY;  Service: Gastroenterology   • CYST REMOVAL     •  "ENDOSCOPY N/A 12/7/2018    Procedure: ESOPHAGOGASTRODUODENOSCOPY with biopsies, resolution clip X 1 placed;  Surgeon: Maxx Tavarez MD;  Location: [facility] ENDOSCOPY;  Service: Gastroenterology   • ENDOSCOPY N/A 3/8/2019    Procedure: ESOPHAGOGASTRODUODENOSCOPY;  Surgeon: Maxx Tavarez MD;  Location: [facility] ENDOSCOPY;  Service: Gastroenterology       Family History   Problem Relation Age of Onset   • COPD Mother    • Hypertension Mother    • Stroke Mother    • Osteoporosis Mother    • COPD Father    • Pneumonia Father    • Coronary artery disease Brother         Brother with CABG at 79       Social History     Socioeconomic History   • Marital status:    Tobacco Use   • Smoking status: Never Smoker   • Smokeless tobacco: Never Used   Substance and Sexual Activity   • Alcohol use: Yes     Comment: rarely   • Drug use: No   • Sexual activity: Defer           The following portions of the patient's history were reviewed and updated as appropriate: problem list, allergies, current medications, past medical history, past family history, past social history and past surgical history.    Review of Systems    Immunization History   Administered Date(s) Administered   • COVID-19 ([employer]) 04/10/2021, 11/17/2021   • TD Preservative Free 03/19/2019       Objective   Vitals:    02/11/22 1118   BP: 132/76   Temp: 97.8 °F (36.6 °C)   Weight: 78 kg (172 lb)   Height: 182.9 cm (72.01\")     Body mass index is 23.32 kg/m².  Physical Exam  Vitals reviewed.   Constitutional:       Appearance: He is well-developed.   HENT:      Head: Normocephalic and atraumatic.   Cardiovascular:      Rate and Rhythm: Normal rate and regular rhythm. Occasional extrasystoles are present.     Heart sounds: Normal heart sounds, S1 normal and S2 normal.   Pulmonary:      Effort: Pulmonary effort is normal.      Breath sounds: Normal breath sounds.   Skin:     General: Skin is warm.   Neurological:      Mental Status: He is alert.   Psychiatric:  "        Behavior: Behavior normal.         Procedures    Assessment/Plan   Diagnoses and all orders for this visit:    1. Frequent PVCs (Primary)    2. Primary hypertension  -     hydroCHLOROthiazide (HYDRODIURIL) 12.5 MG tablet; Take 1 tablet by mouth Daily.  Dispense: 90 tablet; Refill: 2  -     amLODIPine (NORVASC) 10 MG tablet; Take 1 tablet by mouth Daily.  Dispense: 90 tablet; Refill: 2  -     Basic Metabolic Panel; Future             BP is decent. Reviewed echo and stress test. He has seen cardiology.    Return in about 6 months (around 8/11/2022) for Lab Before FUP.

## 2022-08-17 ENCOUNTER — OFFICE VISIT (OUTPATIENT)
Dept: CARDIOLOGY | Facility: CLINIC | Age: 77
End: 2022-08-17

## 2022-08-17 VITALS
BODY MASS INDEX: 23.57 KG/M2 | HEIGHT: 72 IN | OXYGEN SATURATION: 99 % | HEART RATE: 91 BPM | DIASTOLIC BLOOD PRESSURE: 74 MMHG | WEIGHT: 174 LBS | SYSTOLIC BLOOD PRESSURE: 118 MMHG

## 2022-08-17 DIAGNOSIS — I49.3 FREQUENT PVCS: Primary | ICD-10-CM

## 2022-08-17 DIAGNOSIS — I10 PRIMARY HYPERTENSION: ICD-10-CM

## 2022-08-17 PROCEDURE — 99213 OFFICE O/P EST LOW 20 MIN: CPT | Performed by: INTERNAL MEDICINE

## 2022-08-17 NOTE — PROGRESS NOTES
Date of Office Visit: 2022  Encounter Provider: Dell Nguyen MD  Place of Service: [facility] CARDIOLOGY  Patient Name: Chacorta Langston  :1945    Chief complaint: Frequent PVCs.    History of Present Illness:    Dear Johny:    I had the pleasure of seeing your patient in cardiology office on 2022.  He is a very pleasant 77 year-old male with a history of frequent PVCs and hypertension who presents for follow-up.    The patient was found to have ectopic beats on exam and on an EKG from 12/3/2021.  He subsequently wore a 24-hour Holter monitor on 2021, which showed very frequent PVCs.  In fact, his total PVC burden was 39%.  He had frequent bigeminy and trigeminy, as well as intermittent ventricular couplets and triplets.  He has a history of PVCs for years, and has been largely asymptomatic.  He did have an echocardiogram performed on 2022 which showed an ejection fraction of 56%, grade 1 diastolic dysfunction, and mild to moderate aortic insufficiency.  He does have a family history of coronary artery disease with a brother who had a CABG at age 79, although an exercise treadmill stress test on 2022 was normal.  The PVCs actually improved with exercise.    The patient presents today for follow-up.  Overall, he has been doing well.  He did have COVID-19 in May 2022, although this was a mild case.  He still does not feel any significant palpitations.  He also denied any chest discomfort or shortness of breath.  He walks and exercises routinely without issues.    Past Medical History:   Diagnosis Date   • Clostridium difficile infection 10/8/2019   • Hypertension        Past Surgical History:   Procedure Laterality Date   • COLONOSCOPY N/A 2018    Procedure: COLONOSCOPY to cecum and terminal ileum;  Surgeon: Maxx Tavarez MD;  Location: [facility] ENDOSCOPY;  Service: Gastroenterology   • CYST REMOVAL     • ENDOSCOPY N/A 2018    Procedure:  "ESOPHAGOGASTRODUODENOSCOPY with biopsies, resolution clip X 1 placed;  Surgeon: Maxx Tavarez MD;  Location: [facility] ENDOSCOPY;  Service: Gastroenterology   • ENDOSCOPY N/A 3/8/2019    Procedure: ESOPHAGOGASTRODUODENOSCOPY;  Surgeon: Maxx Tavarez MD;  Location: [facility] ENDOSCOPY;  Service: Gastroenterology       Current Outpatient Medications on File Prior to Visit   Medication Sig Dispense Refill   • amLODIPine (NORVASC) 10 MG tablet Take 1 tablet by mouth Daily. 90 tablet 2   • ascorbic acid (VITAMIN C) 500 MG tablet Take 500 mg by mouth Daily.     • BIOTIN PO Take  by mouth.     • calcium carbonate-cholecalciferol 500-400 MG-UNIT tablet tablet Take  by mouth Daily.     • hydroCHLOROthiazide (HYDRODIURIL) 12.5 MG tablet Take 1 tablet by mouth Daily. 90 tablet 2   • saw palmetto 160 MG capsule Take 450 mg by mouth 4 (Four) Times a Day.       No current facility-administered medications on file prior to visit.     Allergies as of 08/17/2022 - Reviewed 08/17/2022   Allergen Reaction Noted   • Clindamycin/lincomycin Rash 03/27/2019   • Ace inhibitors Other (See Comments) 12/03/2021   • Beta adrenergic blockers Unknown (See Comments) 03/11/2016   • Penicillins Rash 03/11/2016     Social History     Socioeconomic History   • Marital status:    Tobacco Use   • Smoking status: Never Smoker   • Smokeless tobacco: Never Used   Substance and Sexual Activity   • Alcohol use: Yes     Comment: rarely   • Drug use: No   • Sexual activity: Defer     Family History   Problem Relation Age of Onset   • COPD Mother    • Hypertension Mother    • Stroke Mother    • Osteoporosis Mother    • COPD Father    • Pneumonia Father    • Coronary artery disease Brother         Brother with CABG at 79       Review of Systems   All other systems reviewed and are negative.     Objective:     Vitals:    08/17/22 1424   BP: 118/74   Pulse: 91   SpO2: 99%   Weight: 78.9 kg (174 lb)   Height: 182.9 cm (72.01\")     Body mass index is 23.59 " kg/m².    Constitutional:       Appearance: Healthy appearance. Well-developed.   Eyes:      Conjunctiva/sclera: Conjunctivae normal.   HENT:      Head: Normocephalic and atraumatic.   Pulmonary:      Effort: Pulmonary effort is normal.      Breath sounds: Normal breath sounds.   Cardiovascular:      Normal rate. Occasional ectopic beats. Regular rhythm.      Murmurs: There is no murmur.      No gallop.   Edema:     Peripheral edema absent.   Abdominal:      Palpations: Abdomen is soft.      Tenderness: There is no abdominal tenderness.   Musculoskeletal:      Cervical back: Neck supple. Skin:     General: Skin is warm.   Neurological:      Mental Status: Alert and oriented to person, place, and time.   Psychiatric:         Behavior: Behavior normal.       Lab Review:   Procedures    Lipid Panel    Lipid Panel 10/29/21   Total Cholesterol 195   Triglycerides 61   HDL Cholesterol 50   VLDL Cholesterol 11   LDL Cholesterol  134 (A)   (A) Abnormal value               Cardiac Procedures:  1.  24-hour Holter monitor on 12/22/2021: There were frequent PVCs with a total burden of 39%.  There were frequent episodes of bigeminy and trigeminy.  There were intermittent couplets and triplets.  The average heart rate was 66 bpm.  2.  Echocardiogram on 1/26/2022: The ejection fraction was 56%.  There was grade 1 diastolic dysfunction.  There was mild to moderate aortic insufficiency.  The ascending aorta was borderline dilated at 3.6 cm.  3.  Exercise treadmill stress test on 2/9/2022: The patient exercised for 9 minutes without significant symptoms.  PVCs improved with exercise.    Assessment:       Diagnosis Plan   1. Frequent PVCs  Adult Transthoracic Echo Complete w/ Color, Spectral and Contrast if Necessary Per Protocol   2. Primary hypertension       Plan:       Again, he is completely asymptomatic from the PVCs, although his PVC burden was very high at 39% on the monitor.  He did have frequent bigeminy and trigeminy, as  well as intermittent ventricular couplets and triplets.  He has not had any ventricular tachycardia above 3 beats in the past.  He is having no syncope or near syncope.  His ejection fraction on his echocardiogram from 1/26/2022 was 56%.  With a high PVC burden such as this, he needs periodic assessment of his ejection fraction to ensure he does not develop a PVC induced cardiomyopathy.  Even at this high rate of PVCs, only 10% of patients would potentially develop a cardiomyopathy.      His PVCs improved with exercise, which is the normal response.  His treadmill stress test was excellent.  He still walks and exercises routinely without any symptoms.  We did discuss beta-blockers in the past, although he told me that one of his toes got very cold with beta-blockers in the past, and he felt like it was impairing his circulation.  Because he was asymptomatic, we decided not to use beta-blockers for now.    I am going to have him follow-up in 6 months with an echocardiogram on the same day.

## 2022-08-19 ENCOUNTER — OFFICE VISIT (OUTPATIENT)
Dept: INTERNAL MEDICINE | Facility: CLINIC | Age: 77
End: 2022-08-19

## 2022-08-19 VITALS
SYSTOLIC BLOOD PRESSURE: 124 MMHG | DIASTOLIC BLOOD PRESSURE: 70 MMHG | BODY MASS INDEX: 23.3 KG/M2 | HEIGHT: 72 IN | WEIGHT: 172 LBS | TEMPERATURE: 97.8 F

## 2022-08-19 DIAGNOSIS — I10 PRIMARY HYPERTENSION: Primary | ICD-10-CM

## 2022-08-19 DIAGNOSIS — E78.49 OTHER HYPERLIPIDEMIA: Chronic | ICD-10-CM

## 2022-08-19 PROBLEM — N28.9 MILD RENAL INSUFFICIENCY: Status: RESOLVED | Noted: 2021-03-05 | Resolved: 2022-08-19

## 2022-08-19 PROBLEM — K25.0 ACUTE GASTRIC ULCER WITH HEMORRHAGE: Status: RESOLVED | Noted: 2018-12-11 | Resolved: 2022-08-19

## 2022-08-19 PROBLEM — D50.9 IRON DEFICIENCY ANEMIA: Status: RESOLVED | Noted: 2019-10-08 | Resolved: 2022-08-19

## 2022-08-19 PROBLEM — K22.2 SCHATZKI'S RING OF DISTAL ESOPHAGUS: Status: RESOLVED | Noted: 2018-12-11 | Resolved: 2022-08-19

## 2022-08-19 PROCEDURE — 99213 OFFICE O/P EST LOW 20 MIN: CPT | Performed by: INTERNAL MEDICINE

## 2022-08-19 NOTE — PROGRESS NOTES
Subjective        Chief Complaint   Patient presents with   • Hypertension           Chacorta Langston is a 77 y.o. male who presents for    Patient Active Problem List   Diagnosis   • DDD (degenerative disc disease), lumbosacral   • Other hyperlipidemia   • Diverticulosis   • Primary hypertension   • Frequent PVCs   • Nonrheumatic aortic valve insufficiency       History of Present Illness     He had COVID in May with a sinus headache. He recovered without any issues. He has seen Dr. Nguyen and he is to have an echo in 6 months. He denies palpitations unless he gets anxious. He has not been checking his BP. He walks a 5k 4-5 times per week. He saw urology in Feb for his PSA.  Allergies   Allergen Reactions   • Clindamycin/Lincomycin Rash   • Ace Inhibitors Other (See Comments)     Renal insufficiency   • Beta Adrenergic Blockers Unknown (See Comments)     'COLD FEET'   • Penicillins Rash       Current Outpatient Medications on File Prior to Visit   Medication Sig Dispense Refill   • amLODIPine (NORVASC) 10 MG tablet Take 1 tablet by mouth Daily. 90 tablet 2   • ascorbic acid (VITAMIN C) 500 MG tablet Take 500 mg by mouth Daily.     • BIOTIN PO Take  by mouth.     • calcium carbonate-cholecalciferol 500-400 MG-UNIT tablet tablet Take  by mouth Daily.     • hydroCHLOROthiazide (HYDRODIURIL) 12.5 MG tablet Take 1 tablet by mouth Daily. 90 tablet 2   • saw palmetto 160 MG capsule Take 450 mg by mouth 4 (Four) Times a Day.       No current facility-administered medications on file prior to visit.       Past Medical History:   Diagnosis Date   • Acute gastric ulcer with hemorrhage 12/11/2018   • Basal cell cancer 3/25/2016   • Clostridium difficile infection 10/08/2019   • COVID-19 05/2022   • Iron deficiency anemia 10/8/2019   • Mild renal insufficiency 3/5/2021   • Schatzki's ring of distal esophagus 12/11/2018       Past Surgical History:   Procedure Laterality Date   • COLONOSCOPY N/A 12/7/2018    Procedure:  "COLONOSCOPY to cecum and terminal ileum;  Surgeon: Maxx Tavarez MD;  Location: [facility] ENDOSCOPY;  Service: Gastroenterology   • CYST REMOVAL     • ENDOSCOPY N/A 12/7/2018    Procedure: ESOPHAGOGASTRODUODENOSCOPY with biopsies, resolution clip X 1 placed;  Surgeon: Maxx Tavarez MD;  Location: [facility] ENDOSCOPY;  Service: Gastroenterology   • ENDOSCOPY N/A 3/8/2019    Procedure: ESOPHAGOGASTRODUODENOSCOPY;  Surgeon: Maxx Tavarez MD;  Location: [facility] ENDOSCOPY;  Service: Gastroenterology       Family History   Problem Relation Age of Onset   • COPD Mother    • Hypertension Mother    • Stroke Mother    • Osteoporosis Mother    • COPD Father    • Pneumonia Father    • Coronary artery disease Brother         Brother with CABG at 79       Social History     Socioeconomic History   • Marital status:    Tobacco Use   • Smoking status: Never Smoker   • Smokeless tobacco: Never Used   Substance and Sexual Activity   • Alcohol use: Yes     Comment: rarely   • Drug use: No   • Sexual activity: Defer           The following portions of the patient's history were reviewed and updated as appropriate: problem list, allergies, current medications, past medical history, past family history, past social history and past surgical history.    Review of Systems    Immunization History   Administered Date(s) Administered   • COVID-19 ([employer]) 04/10/2021, 11/17/2021   • TD Preservative Free 03/19/2019       Objective   Vitals:    08/19/22 1243   BP: 124/70   Temp: 97.8 °F (36.6 °C)   Weight: 78 kg (172 lb)   Height: 182.9 cm (72.01\")     Body mass index is 23.32 kg/m².  Physical Exam  Vitals reviewed.   Constitutional:       Appearance: He is well-developed.   HENT:      Head: Normocephalic and atraumatic.   Cardiovascular:      Rate and Rhythm: Normal rate and regular rhythm. Frequent extrasystoles are present.     Heart sounds: Normal heart sounds, S1 normal and S2 normal.   Pulmonary:      Effort: Pulmonary effort is " normal.      Breath sounds: Normal breath sounds.   Skin:     General: Skin is warm.   Neurological:      Mental Status: He is alert.   Psychiatric:         Behavior: Behavior normal.         Procedures    Assessment & Plan   Diagnoses and all orders for this visit:    1. Primary hypertension (Primary)  -     CBC & Differential; Future    2. Other hyperlipidemia  -     Comprehensive Metabolic Panel; Future  -     Lipid Panel With / Chol / HDL Ratio; Future               Reviewed bmp. BP is great.  Return in about 6 months (around 2/19/2023) for Medicare Wellness, Lab Before FUP.

## 2022-11-21 DIAGNOSIS — I10 PRIMARY HYPERTENSION: ICD-10-CM

## 2022-11-21 RX ORDER — HYDROCHLOROTHIAZIDE 12.5 MG/1
TABLET ORAL
Qty: 90 TABLET | Refills: 2 | Status: SHIPPED | OUTPATIENT
Start: 2022-11-21 | End: 2023-02-24 | Stop reason: SDUPTHER

## 2023-01-23 DIAGNOSIS — I10 PRIMARY HYPERTENSION: ICD-10-CM

## 2023-01-23 RX ORDER — AMLODIPINE BESYLATE 10 MG/1
TABLET ORAL
Qty: 30 TABLET | Refills: 1 | Status: SHIPPED | OUTPATIENT
Start: 2023-01-23 | End: 2023-02-24 | Stop reason: SDUPTHER

## 2023-02-24 ENCOUNTER — OFFICE VISIT (OUTPATIENT)
Dept: INTERNAL MEDICINE | Facility: CLINIC | Age: 78
End: 2023-02-24
Payer: MEDICARE

## 2023-02-24 VITALS
SYSTOLIC BLOOD PRESSURE: 132 MMHG | BODY MASS INDEX: 23.16 KG/M2 | TEMPERATURE: 98.2 F | DIASTOLIC BLOOD PRESSURE: 72 MMHG | HEIGHT: 72 IN | WEIGHT: 171 LBS

## 2023-02-24 DIAGNOSIS — E78.49 OTHER HYPERLIPIDEMIA: Chronic | ICD-10-CM

## 2023-02-24 DIAGNOSIS — I49.3 FREQUENT PVCS: ICD-10-CM

## 2023-02-24 DIAGNOSIS — I10 PRIMARY HYPERTENSION: ICD-10-CM

## 2023-02-24 DIAGNOSIS — Z00.00 ENCOUNTER FOR SUBSEQUENT ANNUAL WELLNESS VISIT (AWV) IN MEDICARE PATIENT: Primary | ICD-10-CM

## 2023-02-24 PROCEDURE — G0439 PPPS, SUBSEQ VISIT: HCPCS | Performed by: INTERNAL MEDICINE

## 2023-02-24 PROCEDURE — 1160F RVW MEDS BY RX/DR IN RCRD: CPT | Performed by: INTERNAL MEDICINE

## 2023-02-24 PROCEDURE — 1126F AMNT PAIN NOTED NONE PRSNT: CPT | Performed by: INTERNAL MEDICINE

## 2023-02-24 PROCEDURE — 1159F MED LIST DOCD IN RCRD: CPT | Performed by: INTERNAL MEDICINE

## 2023-02-24 PROCEDURE — 1170F FXNL STATUS ASSESSED: CPT | Performed by: INTERNAL MEDICINE

## 2023-02-24 PROCEDURE — 1125F AMNT PAIN NOTED PAIN PRSNT: CPT | Performed by: INTERNAL MEDICINE

## 2023-02-24 RX ORDER — AMLODIPINE BESYLATE 10 MG/1
10 TABLET ORAL DAILY
Qty: 90 TABLET | Refills: 2 | Status: SHIPPED | OUTPATIENT
Start: 2023-02-24

## 2023-02-24 RX ORDER — HYDROCHLOROTHIAZIDE 12.5 MG/1
12.5 TABLET ORAL DAILY
Qty: 90 TABLET | Refills: 2 | Status: SHIPPED | OUTPATIENT
Start: 2023-02-24

## 2023-02-24 NOTE — PROGRESS NOTES
The ABCs of the Annual Wellness Visit  Subsequent Medicare Wellness Visit    Subjective    Chacorta Langston is a 77 y.o. male who presents for a Subsequent Medicare Wellness Visit.  He denies chest pain or dyspnea. He has had only one episode of skipped heart beats in the last 6 months when he got anxious. He has not been checking his BP.  The following portions of the patient's history were reviewed and   updated as appropriate: allergies, current medications, past family history, past medical history, past social history, past surgical history and problem list.    Compared to one year ago, the patient feels his physical   health is the same.    Compared to one year ago, the patient feels his mental   health is the same.    Recent Hospitalizations:  He was not admitted to the hospital during the last year.       Current Medical Providers:  Patient Care Team:  Baron Colón MD as PCP - General (Internal Medicine)    Outpatient Medications Prior to Visit   Medication Sig Dispense Refill   • ascorbic acid (VITAMIN C) 500 MG tablet Take 500 mg by mouth Daily.     • BIOTIN PO Take  by mouth.     • calcium carbonate-cholecalciferol 500-400 MG-UNIT tablet tablet Take  by mouth Daily.     • saw palmetto 160 MG capsule Take 450 mg by mouth 4 (Four) Times a Day.     • amLODIPine (NORVASC) 10 MG tablet TAKE ONE TABLET BY MOUTH DAILY 30 tablet 1   • hydroCHLOROthiazide (HYDRODIURIL) 12.5 MG tablet TAKE ONE TABLET BY MOUTH DAILY 90 tablet 2     No facility-administered medications prior to visit.       No opioid medication identified on active medication list. I have reviewed chart for other potential  high risk medication/s and harmful drug interactions in the elderly.          Aspirin is not on active medication list.  Aspirin use is not indicated based on review of current medical condition/s. Risk of harm outweighs potential benefits.  .    Patient Active Problem List   Diagnosis   • DDD (degenerative disc disease),  "lumbosacral   • Other hyperlipidemia   • Diverticulosis   • Primary hypertension   • Frequent PVCs   • Nonrheumatic aortic valve insufficiency     Advance Care Planning  Advance Directive is not on file.  ACP discussion was held with the patient during this visit. Patient has an advance directive (not in EMR), copy requested.     Objective    Vitals:    02/24/23 1320   BP: 132/72   Temp: 98.2 °F (36.8 °C)   TempSrc: Temporal   Weight: 77.6 kg (171 lb)   Height: 182.9 cm (72.01\")     Estimated body mass index is 23.19 kg/m² as calculated from the following:    Height as of this encounter: 182.9 cm (72.01\").    Weight as of this encounter: 77.6 kg (171 lb).    BMI is within normal parameters. No other follow-up for BMI required.      Does the patient have evidence of cognitive impairment? No    Lab Results   Component Value Date    CHLPL 220 (H) 02/21/2023    TRIG 93 02/21/2023    HDL 47 02/21/2023     (H) 02/21/2023    VLDL 17 02/21/2023        HEALTH RISK ASSESSMENT    Smoking Status:  Social History     Tobacco Use   Smoking Status Never   Smokeless Tobacco Never     Alcohol Consumption:  Social History     Substance and Sexual Activity   Alcohol Use Yes    Comment: rarely     Fall Risk Screen:    OSCARADI Fall Risk Assessment was completed, and patient is at LOW risk for falls.Assessment completed on:2/24/2023    Depression Screening:  PHQ-2/PHQ-9 Depression Screening 2/24/2023   Little Interest or Pleasure in Doing Things 0-->not at all   Feeling Down, Depressed or Hopeless 0-->not at all   PHQ-9: Brief Depression Severity Measure Score 0       Health Habits and Functional and Cognitive Screening:  Functional & Cognitive Status 2/24/2023   Do you have difficulty preparing food and eating? No   Do you have difficulty bathing yourself, getting dressed or grooming yourself? No   Do you have difficulty using the toilet? No   Do you have difficulty moving around from place to place? No   Do you have trouble with " steps or getting out of a bed or a chair? No   Current Diet Well Balanced Diet   Dental Exam Up to date   Eye Exam Up to date   Exercise (times per week) 3 times per week   Current Exercises Include Walking   Current Exercise Activities Include -   Do you need help using the phone?  No   Are you deaf or do you have serious difficulty hearing?  No   Do you need help with transportation? No   Do you need help shopping? No   Do you need help preparing meals?  No   Do you need help with housework?  No   Do you need help with laundry? No   Do you need help taking your medications? No   Do you need help managing money? No   Do you ever drive or ride in a car without wearing a seat belt? No   Have you felt unusual stress, anger or loneliness in the last month? No   Who do you live with? Spouse   If you need help, do you have trouble finding someone available to you? No   Have you been bothered in the last four weeks by sexual problems? No   Do you have difficulty concentrating, remembering or making decisions? No       Age-appropriate Screening Schedule:  Refer to the list below for future screening recommendations based on patient's age, sex and/or medical conditions. Orders for these recommended tests are listed in the plan section. The patient has been provided with a written plan.    Health Maintenance   Topic Date Due   • COVID-19 Vaccine (3 - Booster for Thomas series) 01/12/2022   • INFLUENZA VACCINE  01/01/2032 (Originally 8/1/2022)   • Pneumococcal Vaccine 65+ (1 - PCV) 03/05/2050 (Originally 6/15/2010)   • ZOSTER VACCINE (1 of 2) 03/05/2050 (Originally 6/15/1995)   • LIPID PANEL  02/21/2024   • ANNUAL WELLNESS VISIT  02/24/2024   • COLORECTAL CANCER SCREENING  12/07/2028   • TDAP/TD VACCINES (2 - Tdap) 03/19/2029   • HEPATITIS C SCREENING  Addressed                CMS Preventative Services Quick Reference  Risk Factors Identified During Encounter  Immunizations Discussed/Encouraged: Influenza, Prevnar 20  "(Pneumococcal 20-valent conjugate) and Shingrix  The above risks/problems have been discussed with the patient.  Pertinent information has been shared with the patient in the After Visit Summary.  An After Visit Summary and PPPS were made available to the patient.    Follow Up:   Next Medicare Wellness visit to be scheduled in 1 year.       Additional E&M Note during same encounter follows:  Patient has multiple medical problems which are significant and separately identifiable that require additional work above and beyond the Medicare Wellness Visit.      Chief Complaint  Medicare Wellness-subsequent    Subjective        HPI  Chacorta Langston is also being seen today for Medicare Wellness and HTN.  He denies chest pain or dyspnea. He has had only one episode of skipped heart beats in the last 6 months when he got anxious. He has not been checking his BP.         Objective   Vital Signs:  /72   Temp 98.2 °F (36.8 °C) (Temporal)   Ht 182.9 cm (72.01\")   Wt 77.6 kg (171 lb)   BMI 23.19 kg/m²     Physical Exam  Vitals reviewed.   Constitutional:       Appearance: He is well-developed.   HENT:      Head: Normocephalic and atraumatic.   Neck:      Vascular: No carotid bruit.   Cardiovascular:      Rate and Rhythm: Normal rate and regular rhythm. Frequent extrasystoles are present.     Heart sounds: Normal heart sounds, S1 normal and S2 normal.   Pulmonary:      Effort: Pulmonary effort is normal.      Breath sounds: Normal breath sounds.   Skin:     General: Skin is warm.   Neurological:      Mental Status: He is alert.   Psychiatric:         Behavior: Behavior normal.                         Assessment and Plan   Diagnoses and all orders for this visit:    1. Encounter for subsequent annual wellness visit (AWV) in Medicare patient (Primary)    2. Primary hypertension  -     Basic Metabolic Panel; Future  -     amLODIPine (NORVASC) 10 MG tablet; Take 1 tablet by mouth Daily.  Dispense: 90 tablet; Refill: 2  -   "   hydroCHLOROthiazide (HYDRODIURIL) 12.5 MG tablet; Take 1 tablet by mouth Daily.  Dispense: 90 tablet; Refill: 2    3. Other hyperlipidemia  Comments:  declines statin. He will r/s exercise and decrease cheese    4. Frequent PVCs  Comments:  sees cards next month. He has an echo set up             Follow Up   Return in about 6 months (around 8/24/2023) for Lab Before FUP.  Patient was given instructions and counseling regarding his condition or for health maintenance advice. Please see specific information pulled into the AVS if appropriate.     Reviewed labs. BP is on upper limit nl. Declines other immunizations or statin. He will call Dr. Rayray Velázquez for his routine f/u.

## 2023-03-15 ENCOUNTER — HOSPITAL ENCOUNTER (OUTPATIENT)
Dept: CARDIOLOGY | Facility: HOSPITAL | Age: 78
Discharge: HOME | End: 2023-03-15
Admitting: INTERNAL MEDICINE
Payer: MEDICARE

## 2023-03-15 ENCOUNTER — OFFICE VISIT (OUTPATIENT)
Dept: CARDIOLOGY | Facility: CLINIC | Age: 78
End: 2023-03-15
Payer: MEDICARE

## 2023-03-15 VITALS
SYSTOLIC BLOOD PRESSURE: 150 MMHG | DIASTOLIC BLOOD PRESSURE: 80 MMHG | BODY MASS INDEX: 23.16 KG/M2 | HEART RATE: 87 BPM | OXYGEN SATURATION: 98 % | HEIGHT: 72 IN | WEIGHT: 171 LBS

## 2023-03-15 VITALS
SYSTOLIC BLOOD PRESSURE: 118 MMHG | WEIGHT: 171.6 LBS | DIASTOLIC BLOOD PRESSURE: 74 MMHG | HEIGHT: 72 IN | BODY MASS INDEX: 23.24 KG/M2 | HEART RATE: 73 BPM

## 2023-03-15 DIAGNOSIS — I10 PRIMARY HYPERTENSION: ICD-10-CM

## 2023-03-15 DIAGNOSIS — I49.8 VENTRICULAR BIGEMINY: ICD-10-CM

## 2023-03-15 DIAGNOSIS — I49.3 FREQUENT PVCS: Primary | ICD-10-CM

## 2023-03-15 DIAGNOSIS — I35.1 NONRHEUMATIC AORTIC VALVE INSUFFICIENCY: ICD-10-CM

## 2023-03-15 DIAGNOSIS — I49.3 FREQUENT PVCS: ICD-10-CM

## 2023-03-15 LAB
AORTIC ARCH: 1.9 CM
AORTIC DIMENSIONLESS INDEX: 0.9 (DI)
ASCENDING AORTA: 3.4 CM
BH CV ECHO MEAS - ACS: 2.28 CM
BH CV ECHO MEAS - AI P1/2T: 457.6 MSEC
BH CV ECHO MEAS - AO MAX PG: 9.6 MMHG
BH CV ECHO MEAS - AO MEAN PG: 5.6 MMHG
BH CV ECHO MEAS - AO V2 MAX: 154.7 CM/SEC
BH CV ECHO MEAS - AO V2 VTI: 34.5 CM
BH CV ECHO MEAS - AVA(I,D): 3 CM2
BH CV ECHO MEAS - EDV(CUBED): 119.6 ML
BH CV ECHO MEAS - EDV(MOD-SP2): 86 ML
BH CV ECHO MEAS - EDV(MOD-SP4): 79 ML
BH CV ECHO MEAS - EF(MOD-BP): 59.2 %
BH CV ECHO MEAS - EF(MOD-SP2): 61.6 %
BH CV ECHO MEAS - EF(MOD-SP4): 59.5 %
BH CV ECHO MEAS - ESV(CUBED): 42.1 ML
BH CV ECHO MEAS - ESV(MOD-SP2): 33 ML
BH CV ECHO MEAS - ESV(MOD-SP4): 32 ML
BH CV ECHO MEAS - FS: 29.4 %
BH CV ECHO MEAS - IVS/LVPW: 1.07 CM
BH CV ECHO MEAS - IVSD: 1.03 CM
BH CV ECHO MEAS - LAT PEAK E' VEL: 13.1 CM/SEC
BH CV ECHO MEAS - LV DIASTOLIC VOL/BSA (35-75): 39.6 CM2
BH CV ECHO MEAS - LV MASS(C)D: 176.3 GRAMS
BH CV ECHO MEAS - LV MAX PG: 7.5 MMHG
BH CV ECHO MEAS - LV MEAN PG: 4.5 MMHG
BH CV ECHO MEAS - LV SYSTOLIC VOL/BSA (12-30): 16.1 CM2
BH CV ECHO MEAS - LV V1 MAX: 136.7 CM/SEC
BH CV ECHO MEAS - LV V1 VTI: 31.8 CM
BH CV ECHO MEAS - LVIDD: 4.9 CM
BH CV ECHO MEAS - LVIDS: 3.5 CM
BH CV ECHO MEAS - LVOT AREA: 3.2 CM2
BH CV ECHO MEAS - LVOT DIAM: 2.02 CM
BH CV ECHO MEAS - LVPWD: 0.96 CM
BH CV ECHO MEAS - MED PEAK E' VEL: 5.3 CM/SEC
BH CV ECHO MEAS - MR MAX PG: 18.4 MMHG
BH CV ECHO MEAS - MR MAX VEL: 214.5 CM/SEC
BH CV ECHO MEAS - MV A DUR: 0.16 SEC
BH CV ECHO MEAS - MV A MAX VEL: 81.4 CM/SEC
BH CV ECHO MEAS - MV DEC SLOPE: 262.2 CM/SEC2
BH CV ECHO MEAS - MV DEC TIME: 242 MSEC
BH CV ECHO MEAS - MV E MAX VEL: 49.7 CM/SEC
BH CV ECHO MEAS - MV E/A: 0.61
BH CV ECHO MEAS - MV MAX PG: 1.99 MMHG
BH CV ECHO MEAS - MV MEAN PG: 0.84 MMHG
BH CV ECHO MEAS - MV P1/2T: 87.9 MSEC
BH CV ECHO MEAS - MV V2 VTI: 24.9 CM
BH CV ECHO MEAS - MVA(P1/2T): 2.5 CM2
BH CV ECHO MEAS - MVA(VTI): 4.1 CM2
BH CV ECHO MEAS - PA ACC TIME: 0.1 SEC
BH CV ECHO MEAS - PA PR(ACCEL): 33.8 MMHG
BH CV ECHO MEAS - PA V2 MAX: 146.6 CM/SEC
BH CV ECHO MEAS - PULM A REVS DUR: 0.15 SEC
BH CV ECHO MEAS - PULM A REVS VEL: 39.4 CM/SEC
BH CV ECHO MEAS - PULM DIAS VEL: 45.8 CM/SEC
BH CV ECHO MEAS - PULM S/D: 1.48
BH CV ECHO MEAS - PULM SYS VEL: 67.7 CM/SEC
BH CV ECHO MEAS - QP/QS: 0.54
BH CV ECHO MEAS - RAP SYSTOLE: 3 MMHG
BH CV ECHO MEAS - RV MAX PG: 2.05 MMHG
BH CV ECHO MEAS - RV V1 MAX: 71.6 CM/SEC
BH CV ECHO MEAS - RV V1 VTI: 13.6 CM
BH CV ECHO MEAS - RVOT DIAM: 2.27 CM
BH CV ECHO MEAS - RVSP: 25 MMHG
BH CV ECHO MEAS - SI(MOD-SP2): 26.6 ML/M2
BH CV ECHO MEAS - SI(MOD-SP4): 23.6 ML/M2
BH CV ECHO MEAS - SUP REN AO DIAM: 1.6 CM
BH CV ECHO MEAS - SV(LVOT): 101.8 ML
BH CV ECHO MEAS - SV(MOD-SP2): 53 ML
BH CV ECHO MEAS - SV(MOD-SP4): 47 ML
BH CV ECHO MEAS - SV(RVOT): 55.3 ML
BH CV ECHO MEAS - TAPSE (>1.6): 1.94 CM
BH CV ECHO MEAS - TR MAX PG: 22 MMHG
BH CV ECHO MEAS - TR MAX VEL: 234.3 CM/SEC
BH CV ECHO MEASUREMENTS AVERAGE E/E' RATIO: 5.4
BH CV XLRA - RV BASE: 3.2 CM
BH CV XLRA - RV LENGTH: 6.8 CM
BH CV XLRA - RV MID: 2.5 CM
BH CV XLRA - TDI S': 13.3 CM/SEC
LEFT ATRIUM VOLUME INDEX: 20.5 ML/M2
MAXIMAL PREDICTED HEART RATE: 143 BPM
SINUS: 3.3 CM
STJ: 2.7 CM
STRESS TARGET HR: 122 BPM

## 2023-03-15 PROCEDURE — 93306 TTE W/DOPPLER COMPLETE: CPT

## 2023-03-15 PROCEDURE — 3074F SYST BP LT 130 MM HG: CPT | Performed by: NURSE PRACTITIONER

## 2023-03-15 PROCEDURE — 3078F DIAST BP <80 MM HG: CPT | Performed by: NURSE PRACTITIONER

## 2023-03-15 PROCEDURE — 99214 OFFICE O/P EST MOD 30 MIN: CPT | Performed by: NURSE PRACTITIONER

## 2023-03-15 PROCEDURE — 1159F MED LIST DOCD IN RCRD: CPT | Performed by: NURSE PRACTITIONER

## 2023-03-15 PROCEDURE — 93000 ELECTROCARDIOGRAM COMPLETE: CPT | Performed by: NURSE PRACTITIONER

## 2023-03-15 PROCEDURE — 1160F RVW MEDS BY RX/DR IN RCRD: CPT | Performed by: NURSE PRACTITIONER

## 2023-03-15 PROCEDURE — 93306 TTE W/DOPPLER COMPLETE: CPT | Performed by: INTERNAL MEDICINE

## 2023-03-15 NOTE — PROGRESS NOTES
"  Date of Office Visit: 03/15/2023  Encounter Provider: ERNESTINA Enrique  Place of Service: [facility] CARDIOLOGY  Patient Name: Chacorta Langston  :1945      Chief Complaint   Patient presents with   • Follow-up   • PVC   • Hypertension   • Aortic Insufficiency   :     HPI: Chacorta Langston is a 77 y.o. male who presents today for cardiac follow-up visit.  He is a new patient to me and I reviewed his medical records.    He has been diagnosed with hypertension.  In 2021, he was found to have PVCs on EKG.  24-hour Holter showed NSR with a 39% PVC burden (ventricular bigeminy/trigeminy, couplets and triplets noted).  He was asymptomatic.  In 2022, echocardiogram showed EF 56%, grade 1 diastolic dysfunction, and mild to moderate aortic insufficiency.  Treadmill stress test was normal and showed that the PVCs improved with exercise.  He previously was on a beta-blocker which caused coldness of his toes.  Dr. Nguyen has opted not to treat the PVCs since he is asymptomatic.    He presents today for follow-up visit.  He just had an echo completed and the results are pending.  EKG shows ventricular bigeminy and he is asymptomatic.  BP elevated on first check and normal on the second check.  He says he only experiences palpitations if he feels \"antsy\" and this occurs on a rare occasion.  He denies chest pain, shortness of air, PND, orthopnea, edema, dizziness, syncope, or bleeding.  He does not know if he snores.  He drinks alcohol on occasion.      Past Medical History:   Diagnosis Date   • Acute gastric ulcer with hemorrhage 2018   • Basal cell cancer 3/25/2016   • Clostridium difficile infection 10/08/2019   • COVID-19 2022   • Iron deficiency anemia 10/8/2019   • Mild renal insufficiency 3/5/2021   • Schatzki's ring of distal esophagus 2018       Past Surgical History:   Procedure Laterality Date   • COLONOSCOPY N/A 2018    Procedure: " COLONOSCOPY to cecum and terminal ileum;  Surgeon: Maxx Tavarez MD;  Location: [facility] ENDOSCOPY;  Service: Gastroenterology   • CYST REMOVAL     • ENDOSCOPY N/A 12/7/2018    Procedure: ESOPHAGOGASTRODUODENOSCOPY with biopsies, resolution clip X 1 placed;  Surgeon: Maxx Tavarez MD;  Location: [facility] ENDOSCOPY;  Service: Gastroenterology   • ENDOSCOPY N/A 3/8/2019    Procedure: ESOPHAGOGASTRODUODENOSCOPY;  Surgeon: Maxx Tavarez MD;  Location: [facility] ENDOSCOPY;  Service: Gastroenterology       Social History     Socioeconomic History   • Marital status:    Tobacco Use   • Smoking status: Never   • Smokeless tobacco: Never   Substance and Sexual Activity   • Alcohol use: Yes     Comment: rarely   • Drug use: No   • Sexual activity: Defer       Family History   Problem Relation Age of Onset   • COPD Mother    • Hypertension Mother    • Stroke Mother    • Osteoporosis Mother    • COPD Father    • Pneumonia Father    • Coronary artery disease Brother         Brother with CABG at 79       The following portion of the patient's history were reviewed and updated as appropriate: past medical history, past surgical history, past social history, past family history, allergies, current medications, and problem list.    Review of Systems   Constitutional: Negative.   Cardiovascular: Negative.    Respiratory: Negative.    Hematologic/Lymphatic: Negative.    Neurological: Negative.        Allergies   Allergen Reactions   • Clindamycin/Lincomycin Rash   • Ace Inhibitors Other (See Comments)     Renal insufficiency   • Beta Adrenergic Blockers Unknown (See Comments)     'COLD FEET'   • Penicillins Rash         Current Outpatient Medications:   •  amLODIPine (NORVASC) 10 MG tablet, Take 1 tablet by mouth Daily., Disp: 90 tablet, Rfl: 2  •  ascorbic acid (VITAMIN C) 500 MG tablet, Take 1 tablet by mouth Daily., Disp: , Rfl:   •  BIOTIN PO, Take  by mouth., Disp: , Rfl:   •  calcium carbonate-cholecalciferol 500-400  "MG-UNIT tablet tablet, Take  by mouth Daily., Disp: , Rfl:   •  hydroCHLOROthiazide (HYDRODIURIL) 12.5 MG tablet, Take 1 tablet by mouth Daily., Disp: 90 tablet, Rfl: 2  •  saw palmetto 160 MG capsule, Take 450 mg by mouth 4 (Four) Times a Day., Disp: , Rfl:          Objective:     Vitals:    03/15/23 1338 03/15/23 1357   BP: 150/80 118/74   BP Location: Left arm Left arm   Patient Position: Sitting Sitting   Cuff Size: Adult    Pulse: 73    Weight: 77.8 kg (171 lb 9.6 oz)    Height: 182.9 cm (72.01\")      Body mass index is 23.27 kg/m².    PHYSICAL EXAM:    Vitals Reviewed.   General Appearance: No acute distress, well developed and well nourished.   HENT: No hearing loss noted. Wearing mask.   Respiratory: No signs of respiratory distress. Respiration rhythm and depth normal.  Clear to auscultation.   Cardiovascular:  Jugular Venous Pressure: Normal  Heart Rate and Rhythm: Normal, Heart Sounds: Normal S1 and S2. No S3 or S4 noted.  Murmurs: No murmurs noted. No rubs, thrills, or gallops.   Lower Extremities: No edema noted.  Musculoskeletal: Normal movement of extremities.  Skin: General appearance normal.    Psychiatric: Patient alert and oriented to person, place, and time. Speech and behavior appropriate. Normal mood and affect.       ECG 12 Lead    Date/Time: 3/15/2023 1:32 PM  Performed by: Clementina Escobar APRN  Authorized by: Clementina Escobar APRN   Comparison: compared with previous ECG from 12/3/2021  Similar to previous ECG  Rhythm: sinus rhythm  Ectopy: unifocal PVCs and bigeminy  Rate: normal  BPM: 73  Conduction: conduction normal  ST Segments: ST segments normal  T Waves: T waves normal  QRS axis: normal    Clinical impression: abnormal EKG              Assessment:       Diagnosis Plan   1. Frequent PVCs  ECG 12 Lead      2. Ventricular bigeminy  ECG 12 Lead      3. Primary hypertension  ECG 12 Lead      4. Nonrheumatic aortic valve insufficiency  ECG 12 Lead             Plan:       1/2.  He has " had known PVCs and has been pretty asymptomatic.  39% PVC burden per Holter in 2021.  Ventricular bigeminy noted today and he is asymptomatic.  I offered a Holter monitor and he declined.  He has not been treated with beta-blocker therapy due to adverse effects and the fact that he is not symptomatic with the PVCs.    3.  Hypertension: Blood pressure normal on second check.    4.  Mild to moderate aortic insufficiency.  Echocardiogram results today pending.    5.  With Dr. Nguyen transitioning to the hospital he will now be seeing Dr. Colby.  I will further discuss his plan of care with her.    ADDENDUM 3/16/2023:  Echocardiogram showed normal EF and mild aortic regurgitation.  I discussed this plan of care with Dr. Colby.  She recommended a 1 year follow-up visit with her and same-day echocardiogram.  I explained that he declined the Holter monitor and she said okay. Patient verbalized understanding.    As always, it has been a pleasure to participate in your patient's care. Thank you.         Sincerely,         ERNESTINA Deluca  [facility] Cardiology      · Dictated utilizing Dragon Dictation  · COVID-19 Precautions - Patient was compliant in wearing a mask. When I saw the patient, I used appropriate personal protective equipment (PPE) including mask and eye shield (standard procedure).  Additionally, I used gown and gloves if indicated.  Hand hygiene was completed before and after seeing the patient.  · I spent 30 minutes reviewing her medical records/testing/previous office notes/labs, face-to-face interaction with patient, physical examination, formulating the plan of care, and discussion of plan of care with patient.

## 2023-03-16 ENCOUNTER — TELEPHONE (OUTPATIENT)
Dept: CARDIOLOGY | Facility: CLINIC | Age: 78
End: 2023-03-16
Payer: MEDICARE

## 2023-03-16 DIAGNOSIS — I35.1 NONRHEUMATIC AORTIC VALVE INSUFFICIENCY: ICD-10-CM

## 2023-03-16 DIAGNOSIS — I49.3 FREQUENT PVCS: Primary | ICD-10-CM

## 2023-03-16 NOTE — TELEPHONE ENCOUNTER
Scheduling-please arrange an echocardiogram on 3/20/2024 before his appointment with Dr. Colby that day.  Thank you.

## 2023-03-16 NOTE — TELEPHONE ENCOUNTER
Echocardiogram showed normal EF and mild aortic regurgitation.  I discussed this plan of care with Dr. Colby.  She recommended a 1 year follow-up visit with her and same-day echocardiogram.  Patient verbalized understanding.

## 2023-03-16 NOTE — PROGRESS NOTES
Reviewed results with Chacorta Langston and patient verbalized understanding of results.    Encouraged patient to call office with any questions or concerns prior to his next appointment.  Patient stated he will do this.    Thank you,  Rocio HIGUERA RN  Triage Nurse LILIANE

## 2023-03-16 NOTE — PROGRESS NOTES
Can you please let him know that his echocardiogram looks good?  Specifically, his ejection fraction is still intact and normal.  Thanks.    GM

## 2023-10-06 ENCOUNTER — OFFICE VISIT (OUTPATIENT)
Dept: INTERNAL MEDICINE | Facility: CLINIC | Age: 78
End: 2023-10-06
Payer: MEDICARE

## 2023-10-06 VITALS
WEIGHT: 173.4 LBS | TEMPERATURE: 97.5 F | BODY MASS INDEX: 23.49 KG/M2 | HEIGHT: 72 IN | DIASTOLIC BLOOD PRESSURE: 80 MMHG | SYSTOLIC BLOOD PRESSURE: 120 MMHG

## 2023-10-06 DIAGNOSIS — I10 PRIMARY HYPERTENSION: Primary | ICD-10-CM

## 2023-10-06 PROCEDURE — 3079F DIAST BP 80-89 MM HG: CPT | Performed by: INTERNAL MEDICINE

## 2023-10-06 PROCEDURE — 3074F SYST BP LT 130 MM HG: CPT | Performed by: INTERNAL MEDICINE

## 2023-10-06 PROCEDURE — 1159F MED LIST DOCD IN RCRD: CPT | Performed by: INTERNAL MEDICINE

## 2023-10-06 PROCEDURE — 1160F RVW MEDS BY RX/DR IN RCRD: CPT | Performed by: INTERNAL MEDICINE

## 2023-10-06 PROCEDURE — 99213 OFFICE O/P EST LOW 20 MIN: CPT | Performed by: INTERNAL MEDICINE

## 2023-10-06 RX ORDER — AMLODIPINE BESYLATE 10 MG/1
10 TABLET ORAL DAILY
Qty: 90 TABLET | Refills: 2 | Status: SHIPPED | OUTPATIENT
Start: 2023-10-06

## 2023-10-06 RX ORDER — HYDROCHLOROTHIAZIDE 12.5 MG/1
12.5 TABLET ORAL DAILY
Qty: 90 TABLET | Refills: 2 | Status: SHIPPED | OUTPATIENT
Start: 2023-10-06

## 2023-10-06 NOTE — PROGRESS NOTES
Subjective        Chief Complaint   Patient presents with    Hypertension           Chacorta Langston is a 78 y.o. male who presents for    Patient Active Problem List   Diagnosis    DDD (degenerative disc disease), lumbosacral    Other hyperlipidemia    Diverticulosis    Primary hypertension    Frequent PVCs    Nonrheumatic aortic valve insufficiency       History of Present Illness     He saw cards and they want him to repeat an echo in a year. He says his EF is over 55 percent. His PSA dropped into the low 4s. He saw derm and he had some areas frozen.     Allergies   Allergen Reactions    Clindamycin/Lincomycin Rash    Ace Inhibitors Other (See Comments)     Renal insufficiency    Beta Adrenergic Blockers Unknown (See Comments)     'COLD FEET'    Penicillins Rash       Current Outpatient Medications on File Prior to Visit   Medication Sig Dispense Refill    ascorbic acid (VITAMIN C) 500 MG tablet Take 1 tablet by mouth Daily.      BIOTIN PO Take  by mouth.      calcium carbonate-cholecalciferol 500-400 MG-UNIT tablet tablet Take  by mouth Daily.      saw palmetto 160 MG capsule Take 450 mg by mouth 4 (Four) Times a Day.      [DISCONTINUED] amLODIPine (NORVASC) 10 MG tablet Take 1 tablet by mouth Daily. 90 tablet 2    [DISCONTINUED] hydroCHLOROthiazide (HYDRODIURIL) 12.5 MG tablet Take 1 tablet by mouth Daily. 90 tablet 2     No current facility-administered medications on file prior to visit.       Past Medical History:   Diagnosis Date    Acute gastric ulcer with hemorrhage 12/11/2018    Basal cell cancer 3/25/2016    Clostridium difficile infection 10/08/2019    COVID-19 05/2022    Iron deficiency anemia 10/8/2019    Mild renal insufficiency 3/5/2021    Schatzki's ring of distal esophagus 12/11/2018       Past Surgical History:   Procedure Laterality Date    COLONOSCOPY N/A 12/7/2018    Procedure: COLONOSCOPY to cecum and terminal ileum;  Surgeon: Maxx Tavarez MD;  Location: [facility] ENDOSCOPY;  Service:  "Gastroenterology    CYST REMOVAL      ENDOSCOPY N/A 12/7/2018    Procedure: ESOPHAGOGASTRODUODENOSCOPY with biopsies, resolution clip X 1 placed;  Surgeon: Maxx Tavarez MD;  Location: [facility] ENDOSCOPY;  Service: Gastroenterology    ENDOSCOPY N/A 3/8/2019    Procedure: ESOPHAGOGASTRODUODENOSCOPY;  Surgeon: Maxx Tavarez MD;  Location: [facility] ENDOSCOPY;  Service: Gastroenterology       Family History   Problem Relation Age of Onset    COPD Mother     Hypertension Mother     Stroke Mother     Osteoporosis Mother     COPD Father     Pneumonia Father     Coronary artery disease Brother         Brother with CABG at 79       Social History     Socioeconomic History    Marital status:    Tobacco Use    Smoking status: Never    Smokeless tobacco: Never   Substance and Sexual Activity    Alcohol use: Yes     Comment: rarely    Drug use: No    Sexual activity: Defer           The following portions of the patient's history were reviewed and updated as appropriate: problem list, allergies, current medications, past medical history, past family history, past social history, and past surgical history.    Review of Systems    Immunization History   Administered Date(s) Administered    COVID-19 (Aviacomm) 04/10/2021, 11/17/2021    TD Preservative Free (Tenivac) 03/19/2019       Objective   Vitals:    10/06/23 1349   BP: 120/80   Temp: 97.5 °F (36.4 °C)   Weight: 78.7 kg (173 lb 6.4 oz)   Height: 182.9 cm (72.01\")     Body mass index is 23.51 kg/m².  Physical Exam  Vitals reviewed.   Constitutional:       Appearance: He is well-developed.   HENT:      Head: Normocephalic and atraumatic.   Cardiovascular:      Rate and Rhythm: Normal rate and regular rhythm. Occasional are present.     Heart sounds: Normal heart sounds, S1 normal and S2 normal.   Pulmonary:      Effort: Pulmonary effort is normal.      Breath sounds: Normal breath sounds.   Skin:     General: Skin is warm.   Neurological:      Mental Status: He is alert. "   Psychiatric:         Behavior: Behavior normal.       Procedures    Assessment & Plan   Diagnoses and all orders for this visit:    1. Primary hypertension (Primary)  -     Comprehensive Metabolic Panel; Future  -     Lipid Panel With / Chol / HDL Ratio; Future  -     hydroCHLOROthiazide (HYDRODIURIL) 12.5 MG tablet; Take 1 tablet by mouth Daily.  Dispense: 90 tablet; Refill: 2  -     amLODIPine (NORVASC) 10 MG tablet; Take 1 tablet by mouth Daily.  Dispense: 90 tablet; Refill: 2          BP is good. BMP noted. Declines flu shot.         Return in about 6 months (around 4/6/2024) for Medicare Wellness, Lab Before FUP.

## 2024-03-14 NOTE — PROGRESS NOTES
RM:________     PCP: Baron Colón MD    : 1945  AGE: 78 y.o.  EST PATIENT     REASON FOR VISIT/  CC:        BP Readings from Last 3 Encounters:   10/06/23 120/80   03/15/23 150/80   03/15/23 118/74      Wt Readings from Last 3 Encounters:   10/06/23 78.7 kg (173 lb 6.4 oz)   03/15/23 77.6 kg (171 lb)   03/15/23 77.8 kg (171 lb 9.6 oz)        WT: ____________ BP: __________L __________R HR______    CHEST PAIN: _____________    SOA: _____________PALPS: _______________     LIGHTHEADED: ___________FATIGUE: ________________ EDEMA __________    ALLERGIES:Clindamycin/lincomycin, Ace inhibitors, Beta adrenergic blockers, and Penicillins SMOKING HISTORY:  Social History     Tobacco Use    Smoking status: Never    Smokeless tobacco: Never   Substance Use Topics    Alcohol use: Yes     Comment: rarely    Drug use: No     CAFFEINE USE_________________  ALCOHOL ______________________

## 2024-03-20 ENCOUNTER — HOSPITAL ENCOUNTER (OUTPATIENT)
Dept: CARDIOLOGY | Facility: HOSPITAL | Age: 79
Discharge: HOME | End: 2024-03-20
Admitting: NURSE PRACTITIONER
Payer: MEDICARE

## 2024-03-20 ENCOUNTER — OFFICE VISIT (OUTPATIENT)
Dept: CARDIOLOGY | Facility: CLINIC | Age: 79
End: 2024-03-20
Payer: MEDICARE

## 2024-03-20 VITALS
BODY MASS INDEX: 24.54 KG/M2 | DIASTOLIC BLOOD PRESSURE: 74 MMHG | SYSTOLIC BLOOD PRESSURE: 132 MMHG | HEIGHT: 72 IN | HEART RATE: 62 BPM | WEIGHT: 181.2 LBS

## 2024-03-20 VITALS
HEART RATE: 78 BPM | SYSTOLIC BLOOD PRESSURE: 132 MMHG | WEIGHT: 173 LBS | HEIGHT: 72 IN | BODY MASS INDEX: 23.43 KG/M2 | DIASTOLIC BLOOD PRESSURE: 74 MMHG

## 2024-03-20 DIAGNOSIS — I10 PRIMARY HYPERTENSION: ICD-10-CM

## 2024-03-20 DIAGNOSIS — I35.1 NONRHEUMATIC AORTIC VALVE INSUFFICIENCY: ICD-10-CM

## 2024-03-20 DIAGNOSIS — I49.3 FREQUENT PVCS: Primary | ICD-10-CM

## 2024-03-20 DIAGNOSIS — I49.3 FREQUENT PVCS: ICD-10-CM

## 2024-03-20 LAB
AORTIC ARCH: 2.1 CM
ASCENDING AORTA: 3.9 CM
BH CV ECHO MEAS - ACS: 2.21 CM
BH CV ECHO MEAS - AI P1/2T: 639.8 MSEC
BH CV ECHO MEAS - AO MAX PG: 7.4 MMHG
BH CV ECHO MEAS - AO MEAN PG: 4 MMHG
BH CV ECHO MEAS - AO ROOT DIAM: 3.6 CM
BH CV ECHO MEAS - AO V2 MAX: 136 CM/SEC
BH CV ECHO MEAS - AO V2 VTI: 31 CM
BH CV ECHO MEAS - AVA(I,D): 3.5 CM2
BH CV ECHO MEAS - EDV(CUBED): 91.1 ML
BH CV ECHO MEAS - EDV(MOD-SP2): 102 ML
BH CV ECHO MEAS - EDV(MOD-SP4): 92 ML
BH CV ECHO MEAS - EF(MOD-BP): 65.7 %
BH CV ECHO MEAS - EF(MOD-SP2): 67.6 %
BH CV ECHO MEAS - EF(MOD-SP4): 62 %
BH CV ECHO MEAS - ESV(CUBED): 31 ML
BH CV ECHO MEAS - ESV(MOD-SP2): 33 ML
BH CV ECHO MEAS - ESV(MOD-SP4): 35 ML
BH CV ECHO MEAS - FS: 30.2 %
BH CV ECHO MEAS - IVS/LVPW: 1 CM
BH CV ECHO MEAS - IVSD: 1.1 CM
BH CV ECHO MEAS - LAT PEAK E' VEL: 11.9 CM/SEC
BH CV ECHO MEAS - LV DIASTOLIC VOL/BSA (35-75): 45.9 CM2
BH CV ECHO MEAS - LV MASS(C)D: 175 GRAMS
BH CV ECHO MEAS - LV MAX PG: 7 MMHG
BH CV ECHO MEAS - LV MEAN PG: 4 MMHG
BH CV ECHO MEAS - LV SYSTOLIC VOL/BSA (12-30): 17.5 CM2
BH CV ECHO MEAS - LV V1 MAX: 132 CM/SEC
BH CV ECHO MEAS - LV V1 VTI: 28.6 CM
BH CV ECHO MEAS - LVIDD: 4.5 CM
BH CV ECHO MEAS - LVIDS: 3.1 CM
BH CV ECHO MEAS - LVOT AREA: 3.8 CM2
BH CV ECHO MEAS - LVOT DIAM: 2.21 CM
BH CV ECHO MEAS - LVPWD: 1.1 CM
BH CV ECHO MEAS - MED PEAK E' VEL: 8.7 CM/SEC
BH CV ECHO MEAS - MR MAX PG: 88.7 MMHG
BH CV ECHO MEAS - MR MAX VEL: 471 CM/SEC
BH CV ECHO MEAS - MV A DUR: 0.11 SEC
BH CV ECHO MEAS - MV A MAX VEL: 84.4 CM/SEC
BH CV ECHO MEAS - MV DEC SLOPE: 554.4 CM/SEC2
BH CV ECHO MEAS - MV DEC TIME: 0.21 SEC
BH CV ECHO MEAS - MV E MAX VEL: 60 CM/SEC
BH CV ECHO MEAS - MV E/A: 0.71
BH CV ECHO MEAS - MV MAX PG: 3.5 MMHG
BH CV ECHO MEAS - MV MEAN PG: 1.44 MMHG
BH CV ECHO MEAS - MV P1/2T: 53.9 MSEC
BH CV ECHO MEAS - MV V2 VTI: 26.9 CM
BH CV ECHO MEAS - MVA(P1/2T): 4.1 CM2
BH CV ECHO MEAS - MVA(VTI): 4.1 CM2
BH CV ECHO MEAS - PA ACC TIME: 0.11 SEC
BH CV ECHO MEAS - PA V2 MAX: 108.6 CM/SEC
BH CV ECHO MEAS - PULM A REVS DUR: 0.1 SEC
BH CV ECHO MEAS - PULM A REVS VEL: 39.4 CM/SEC
BH CV ECHO MEAS - PULM DIAS VEL: 45.4 CM/SEC
BH CV ECHO MEAS - PULM S/D: 0.75
BH CV ECHO MEAS - PULM SYS VEL: 34.3 CM/SEC
BH CV ECHO MEAS - QP/QS: 0.48
BH CV ECHO MEAS - RAP SYSTOLE: 8 MMHG
BH CV ECHO MEAS - RV MAX PG: 2.6 MMHG
BH CV ECHO MEAS - RV V1 MAX: 80.1 CM/SEC
BH CV ECHO MEAS - RV V1 VTI: 16.4 CM
BH CV ECHO MEAS - RVOT DIAM: 2.03 CM
BH CV ECHO MEAS - RVSP: 28 MMHG
BH CV ECHO MEAS - SI(MOD-SP2): 34.4 ML/M2
BH CV ECHO MEAS - SI(MOD-SP4): 28.5 ML/M2
BH CV ECHO MEAS - SUP REN AO DIAM: 2.4 CM
BH CV ECHO MEAS - SV(LVOT): 109.7 ML
BH CV ECHO MEAS - SV(MOD-SP2): 69 ML
BH CV ECHO MEAS - SV(MOD-SP4): 57 ML
BH CV ECHO MEAS - SV(RVOT): 53.1 ML
BH CV ECHO MEAS - TAPSE (>1.6): 2.07 CM
BH CV ECHO MEAS - TR MAX PG: 19.7 MMHG
BH CV ECHO MEAS - TR MAX VEL: 222 CM/SEC
BH CV ECHO MEASUREMENTS AVERAGE E/E' RATIO: 5.83
BH CV XLRA - RV BASE: 3 CM
BH CV XLRA - RV LENGTH: 7.2 CM
BH CV XLRA - RV MID: 2.26 CM
BH CV XLRA - TDI S': 99 CM/SEC
LEFT ATRIUM VOLUME INDEX: 24.7 ML/M2
SINUS: 4 CM
STJ: 3.1 CM

## 2024-03-20 PROCEDURE — 3075F SYST BP GE 130 - 139MM HG: CPT | Performed by: INTERNAL MEDICINE

## 2024-03-20 PROCEDURE — 99214 OFFICE O/P EST MOD 30 MIN: CPT | Performed by: INTERNAL MEDICINE

## 2024-03-20 PROCEDURE — 3078F DIAST BP <80 MM HG: CPT | Performed by: INTERNAL MEDICINE

## 2024-03-20 PROCEDURE — 93000 ELECTROCARDIOGRAM COMPLETE: CPT | Performed by: INTERNAL MEDICINE

## 2024-03-20 PROCEDURE — 93306 TTE W/DOPPLER COMPLETE: CPT

## 2024-03-20 PROCEDURE — 1159F MED LIST DOCD IN RCRD: CPT | Performed by: INTERNAL MEDICINE

## 2024-03-20 PROCEDURE — 1160F RVW MEDS BY RX/DR IN RCRD: CPT | Performed by: INTERNAL MEDICINE

## 2024-03-20 NOTE — PROGRESS NOTES
Date of Office Visit: 24  Encounter Provider: Oskar Colby MD  Place of Service: [facility]  Patient Name: Chacorta Langston  :1945    Chief Complaint   Patient presents with    Irregular Heart Beat   :     HPI:     Mr. Langston is 78 y.o. and presents today to establish care with me. I have reviewed prior notes and there are no changes except for any new updates described below. I have also reviewed any information entered into the medical record by the patient or by ancillary staff.     He has a history of HTN. In , he was noted to have PVCs. A Holter showed a 40% burden of monomorphic PVCs in bi- and trigeminy. An echo was performed and was normal except for mild-moderate AI. He was briefly on a beta blocker but it caused toe coldness so it was stopped.    He's doing well and has no worrisome cardiac symptoms. He will rarely feel a skipped beat. He denies dyspnea, chest pain, LH, syncope, or edema.     Past Medical History:   Diagnosis Date    Acute gastric ulcer with hemorrhage 2018    Basal cell cancer 2016    Clostridium difficile infection 10/08/2019    COVID-19 2022    Hyperlipidemia     Hypertension     Iron deficiency anemia 10/08/2019    Mild renal insufficiency 2021    Schatzki's ring of distal esophagus 2018       Past Surgical History:   Procedure Laterality Date    COLONOSCOPY N/A 2018    Procedure: COLONOSCOPY to cecum and terminal ileum;  Surgeon: Maxx Tavarez MD;  Location: [facility] ENDOSCOPY;  Service: Gastroenterology    CYST REMOVAL      ENDOSCOPY N/A 2018    Procedure: ESOPHAGOGASTRODUODENOSCOPY with biopsies, resolution clip X 1 placed;  Surgeon: Maxx Tavarez MD;  Location: [facility] ENDOSCOPY;  Service: Gastroenterology    ENDOSCOPY N/A 2019    Procedure: ESOPHAGOGASTRODUODENOSCOPY;  Surgeon: Maxx Tavarez MD;  Location: [facility] ENDOSCOPY;  Service: Gastroenterology       Social  "History     Socioeconomic History    Marital status:    Tobacco Use    Smoking status: Never     Passive exposure: Never    Smokeless tobacco: Never   Vaping Use    Vaping status: Never Used   Substance and Sexual Activity    Alcohol use: Yes     Alcohol/week: 4.0 standard drinks of alcohol     Types: 4 Cans of beer per week    Drug use: No    Sexual activity: Yes     Partners: Female     Birth control/protection: None       Family History   Problem Relation Age of Onset    COPD Mother     Hypertension Mother     Stroke Mother     Osteoporosis Mother     COPD Father     Pneumonia Father     Asthma Father     Coronary artery disease Brother         Brother with CABG at 79    Heart disease Brother     Hyperlipidemia Brother        Review of Systems   All other systems reviewed and are negative.      Allergies   Allergen Reactions    Clindamycin/Lincomycin Rash    Ace Inhibitors Other (See Comments)     Renal insufficiency    Beta Adrenergic Blockers Unknown (See Comments)     'COLD FEET'    Penicillins Rash         Current Outpatient Medications:     amLODIPine (NORVASC) 10 MG tablet, Take 1 tablet by mouth Daily., Disp: 90 tablet, Rfl: 2    ascorbic acid (VITAMIN C) 500 MG tablet, Take 1 tablet by mouth Daily., Disp: , Rfl:     BIOTIN PO, Take  by mouth., Disp: , Rfl:     calcium carbonate-cholecalciferol 500-400 MG-UNIT tablet tablet, Take  by mouth Daily., Disp: , Rfl:     hydroCHLOROthiazide (HYDRODIURIL) 12.5 MG tablet, Take 1 tablet by mouth Daily., Disp: 90 tablet, Rfl: 2    saw palmetto 160 MG capsule, Take 450 mg by mouth 4 (Four) Times a Day., Disp: , Rfl:       Objective:     Vitals:    03/20/24 1345   BP: 132/74   Pulse: 62   Weight: 82.2 kg (181 lb 3.2 oz)   Height: 182.9 cm (72\")     Body mass index is 24.58 kg/m².    Vitals reviewed.   Constitutional:       Appearance: Well-developed and not in distress.   Eyes:      Conjunctiva/sclera: Conjunctivae normal.   HENT:      Head: Normocephalic.      " Nose: Nose normal.    Mouth/Throat:      Pharynx: Oropharynx is clear.   Neck:      Thyroid: Thyroid normal.      Vascular: No JVD. JVD normal.      Lymphadenopathy: No cervical adenopathy.   Pulmonary:      Effort: Pulmonary effort is normal.      Breath sounds: Normal breath sounds.   Cardiovascular:      Normal rate. Regular rhythm.      Murmurs: There is no murmur.   Pulses:     Intact distal pulses.   Edema:     Peripheral edema absent.   Abdominal:      Palpations: Abdomen is soft.      Tenderness: There is no abdominal tenderness.   Musculoskeletal: Normal range of motion.      Cervical back: Normal range of motion. Skin:     General: Skin is warm and dry.   Neurological:      General: No focal deficit present.      Mental Status: Alert and oriented to person, place, and time.      Cranial Nerves: No cranial nerve deficit.   Psychiatric:         Behavior: Behavior normal.         Thought Content: Thought content normal.         Judgment: Judgment normal.           ECG 12 Lead    Date/Time: 3/20/2024 3:32 PM  Performed by: Oskar Colby MD    Authorized by: Oskar Colby MD  Comparison: compared with previous ECG   Similar to previous ECG  Rhythm: sinus rhythm  Conduction: conduction normal  ST Segments: ST segments normal  T Waves: T waves normal  QRS axis: normal  Other: no other findings    Clinical impression: normal ECG            Assessment:       Diagnosis Plan   1. Frequent PVCs        2. Nonrheumatic aortic valve insufficiency        3. Primary hypertension               Plan:       Mr Langston was noted to have frequent monomorphic PVCs in 2021; these seem to have resolved or decreased greatly. I did not hear any on exam and his EKG was normal. He's had normal LV systolic function on echo; this was repeated today. We will hold off on pharmacologic therapy; he was previously intolerant of beta blockers.    An echo today shows mild AI. He has a trileaflet aortic valve and his aortic dimensions are  normal.    His BP is within goal on amlodipine.    Sincerely,       Oskar Colby MD

## 2024-05-10 ENCOUNTER — OFFICE VISIT (OUTPATIENT)
Dept: INTERNAL MEDICINE | Facility: CLINIC | Age: 79
End: 2024-05-10
Payer: MEDICARE

## 2024-05-10 VITALS
WEIGHT: 169.4 LBS | SYSTOLIC BLOOD PRESSURE: 130 MMHG | HEIGHT: 72 IN | BODY MASS INDEX: 22.94 KG/M2 | DIASTOLIC BLOOD PRESSURE: 78 MMHG

## 2024-05-10 DIAGNOSIS — E78.00 HIGH CHOLESTEROL: Chronic | ICD-10-CM

## 2024-05-10 DIAGNOSIS — I10 PRIMARY HYPERTENSION: Chronic | ICD-10-CM

## 2024-05-10 DIAGNOSIS — Z00.00 ENCOUNTER FOR SUBSEQUENT ANNUAL WELLNESS VISIT (AWV) IN MEDICARE PATIENT: Primary | ICD-10-CM

## 2024-05-10 PROCEDURE — 3075F SYST BP GE 130 - 139MM HG: CPT | Performed by: INTERNAL MEDICINE

## 2024-05-10 PROCEDURE — 3078F DIAST BP <80 MM HG: CPT | Performed by: INTERNAL MEDICINE

## 2024-05-10 PROCEDURE — 1126F AMNT PAIN NOTED NONE PRSNT: CPT | Performed by: INTERNAL MEDICINE

## 2024-05-10 PROCEDURE — 1159F MED LIST DOCD IN RCRD: CPT | Performed by: INTERNAL MEDICINE

## 2024-05-10 PROCEDURE — 1160F RVW MEDS BY RX/DR IN RCRD: CPT | Performed by: INTERNAL MEDICINE

## 2024-05-10 PROCEDURE — 1170F FXNL STATUS ASSESSED: CPT | Performed by: INTERNAL MEDICINE

## 2024-05-10 PROCEDURE — G0439 PPPS, SUBSEQ VISIT: HCPCS | Performed by: INTERNAL MEDICINE

## 2024-08-14 DIAGNOSIS — I10 PRIMARY HYPERTENSION: ICD-10-CM

## 2024-08-14 RX ORDER — AMLODIPINE BESYLATE 10 MG/1
10 TABLET ORAL DAILY
Qty: 90 TABLET | Refills: 2 | Status: SHIPPED | OUTPATIENT
Start: 2024-08-14

## 2024-08-14 RX ORDER — HYDROCHLOROTHIAZIDE 12.5 MG/1
12.5 TABLET ORAL DAILY
Qty: 90 TABLET | Refills: 2 | Status: SHIPPED | OUTPATIENT
Start: 2024-08-14

## 2025-01-10 ENCOUNTER — OFFICE VISIT (OUTPATIENT)
Dept: INTERNAL MEDICINE | Facility: CLINIC | Age: 80
End: 2025-01-10
Payer: MEDICARE

## 2025-01-10 VITALS
WEIGHT: 180.8 LBS | BODY MASS INDEX: 24.49 KG/M2 | SYSTOLIC BLOOD PRESSURE: 140 MMHG | HEIGHT: 72 IN | HEART RATE: 72 BPM | DIASTOLIC BLOOD PRESSURE: 80 MMHG

## 2025-01-10 DIAGNOSIS — I10 PRIMARY HYPERTENSION: Primary | Chronic | ICD-10-CM

## 2025-01-10 DIAGNOSIS — N28.9 MILD RENAL INSUFFICIENCY: ICD-10-CM

## 2025-01-10 PROCEDURE — 1160F RVW MEDS BY RX/DR IN RCRD: CPT | Performed by: INTERNAL MEDICINE

## 2025-01-10 PROCEDURE — 1126F AMNT PAIN NOTED NONE PRSNT: CPT | Performed by: INTERNAL MEDICINE

## 2025-01-10 PROCEDURE — 3077F SYST BP >= 140 MM HG: CPT | Performed by: INTERNAL MEDICINE

## 2025-01-10 PROCEDURE — 3079F DIAST BP 80-89 MM HG: CPT | Performed by: INTERNAL MEDICINE

## 2025-01-10 PROCEDURE — G2211 COMPLEX E/M VISIT ADD ON: HCPCS | Performed by: INTERNAL MEDICINE

## 2025-01-10 PROCEDURE — 1159F MED LIST DOCD IN RCRD: CPT | Performed by: INTERNAL MEDICINE

## 2025-01-10 PROCEDURE — 99213 OFFICE O/P EST LOW 20 MIN: CPT | Performed by: INTERNAL MEDICINE

## 2025-01-10 NOTE — PROGRESS NOTES
Subjective        Chief Complaint   Patient presents with    Hypertension           Chacorta Langston is a 79 y.o. male who presents for    Patient Active Problem List   Diagnosis    DDD (degenerative disc disease), lumbosacral    Diverticulosis    Primary hypertension    Frequent PVCs    Nonrheumatic aortic valve insufficiency    High cholesterol       History of Present Illness       Denies chest pain or dyspnea. He has not been checking his Bp. HE does not use NSAIDS. He had been shoveling snow the days before and he had not been drinking much water. Denies changes in urination.  Allergies   Allergen Reactions    Clindamycin/Lincomycin Rash    Ace Inhibitors Other (See Comments)     Renal insufficiency    Beta Adrenergic Blockers Unknown (See Comments)     'COLD FEET'    Penicillins Rash       Current Outpatient Medications on File Prior to Visit   Medication Sig Dispense Refill    amLODIPine (NORVASC) 10 MG tablet TAKE 1 TABLET BY MOUTH DAILY 90 tablet 2    ascorbic acid (VITAMIN C) 500 MG tablet Take 1 tablet by mouth Daily.      BIOTIN PO Take  by mouth.      calcium carbonate-cholecalciferol 500-400 MG-UNIT tablet tablet Take  by mouth Daily.      hydroCHLOROthiazide 12.5 MG tablet TAKE 1 TABLET BY MOUTH DAILY 90 tablet 2    saw palmetto 160 MG capsule Take 450 mg by mouth 4 (Four) Times a Day.       No current facility-administered medications on file prior to visit.       Past Medical History:   Diagnosis Date    Acute gastric ulcer with hemorrhage 12/11/2018    Basal cell cancer 03/25/2016    Clostridium difficile infection 10/08/2019    COVID-19 05/2022    Iron deficiency anemia 10/08/2019    Mild renal insufficiency 03/05/2021    Schatzki's ring of distal esophagus 12/11/2018       Past Surgical History:   Procedure Laterality Date    COLONOSCOPY N/A 12/07/2018    Procedure: COLONOSCOPY to cecum and terminal ileum;  Surgeon: Maxx Tavarez MD;  Location: [facility] ENDOSCOPY;  Service: Gastroenterology     "CYST REMOVAL      ENDOSCOPY N/A 12/07/2018    Procedure: ESOPHAGOGASTRODUODENOSCOPY with biopsies, resolution clip X 1 placed;  Surgeon: Maxx Tavarez MD;  Location: [facility] ENDOSCOPY;  Service: Gastroenterology    ENDOSCOPY N/A 03/08/2019    Procedure: ESOPHAGOGASTRODUODENOSCOPY;  Surgeon: Maxx Tavarez MD;  Location: [facility] ENDOSCOPY;  Service: Gastroenterology       Family History   Problem Relation Age of Onset    COPD Mother     Hypertension Mother     Stroke Mother     Osteoporosis Mother     COPD Father     Pneumonia Father     Asthma Father     Coronary artery disease Brother         Brother with CABG at 79    Diabetes Brother     Other Brother         pulmonary fibrosis       Social History     Socioeconomic History    Marital status:    Tobacco Use    Smoking status: Never     Passive exposure: Never    Smokeless tobacco: Never   Vaping Use    Vaping status: Never Used   Substance and Sexual Activity    Alcohol use: Yes     Comment: 4 drinks per week    Drug use: No    Sexual activity: Yes     Partners: Female     Birth control/protection: None           The following portions of the patient's history were reviewed and updated as appropriate: problem list, allergies, current medications, past medical history, past family history, past social history, and past surgical history.    Review of Systems    Immunization History   Administered Date(s) Administered    COVID-19 (PATRICE) 04/10/2021, 11/17/2021    TD Preservative Free (Tenivac) 03/19/2019       Objective   Vitals:    01/10/25 1225   BP: 140/80   Pulse: 72   Weight: 82 kg (180 lb 12.8 oz)   Height: 182.9 cm (72.01\")     Body mass index is 24.52 kg/m².  Physical Exam  Vitals reviewed.   Constitutional:       Appearance: He is well-developed.   HENT:      Head: Normocephalic and atraumatic.   Cardiovascular:      Rate and Rhythm: Normal rate and regular rhythm.      Heart sounds: Normal heart sounds, S1 normal and S2 normal.   Pulmonary:     "  Effort: Pulmonary effort is normal.      Breath sounds: Normal breath sounds.   Abdominal:      Palpations: Abdomen is soft. There is no hepatomegaly or splenomegaly.      Tenderness: There is no abdominal tenderness.   Skin:     General: Skin is warm.   Neurological:      Mental Status: He is alert.   Psychiatric:         Behavior: Behavior normal.         Procedures    Assessment & Plan   Diagnoses and all orders for this visit:    1. Primary hypertension (Primary)    2. Mild renal insufficiency  -     Basic Metabolic Panel; Future      Reviewed bmp. He will increase his water intake to 48 ounces of water daily. I will recheck BP in a month. Encouraged him to check BP at home regularly.             Return in about 4 weeks (around 2/7/2025) for Lab Before FUP.

## 2025-02-11 ENCOUNTER — LAB (OUTPATIENT)
Dept: LAB | Facility: HOSPITAL | Age: 80
End: 2025-02-11
Payer: MEDICARE

## 2025-02-11 PROCEDURE — 80048 BASIC METABOLIC PNL TOTAL CA: CPT | Performed by: INTERNAL MEDICINE

## 2025-02-14 ENCOUNTER — OFFICE VISIT (OUTPATIENT)
Dept: INTERNAL MEDICINE | Facility: CLINIC | Age: 80
End: 2025-02-14
Payer: MEDICARE

## 2025-02-14 VITALS
WEIGHT: 178.6 LBS | BODY MASS INDEX: 24.19 KG/M2 | DIASTOLIC BLOOD PRESSURE: 80 MMHG | SYSTOLIC BLOOD PRESSURE: 132 MMHG | HEIGHT: 72 IN

## 2025-02-14 DIAGNOSIS — I10 PRIMARY HYPERTENSION: Primary | Chronic | ICD-10-CM

## 2025-02-14 PROCEDURE — 1159F MED LIST DOCD IN RCRD: CPT | Performed by: INTERNAL MEDICINE

## 2025-02-14 PROCEDURE — 1160F RVW MEDS BY RX/DR IN RCRD: CPT | Performed by: INTERNAL MEDICINE

## 2025-02-14 PROCEDURE — 99213 OFFICE O/P EST LOW 20 MIN: CPT | Performed by: INTERNAL MEDICINE

## 2025-02-14 PROCEDURE — 3075F SYST BP GE 130 - 139MM HG: CPT | Performed by: INTERNAL MEDICINE

## 2025-02-14 PROCEDURE — 1126F AMNT PAIN NOTED NONE PRSNT: CPT | Performed by: INTERNAL MEDICINE

## 2025-02-14 PROCEDURE — G2211 COMPLEX E/M VISIT ADD ON: HCPCS | Performed by: INTERNAL MEDICINE

## 2025-02-14 PROCEDURE — 3079F DIAST BP 80-89 MM HG: CPT | Performed by: INTERNAL MEDICINE

## 2025-02-14 RX ORDER — AMLODIPINE BESYLATE 10 MG/1
10 TABLET ORAL DAILY
Qty: 90 TABLET | Refills: 2 | Status: SHIPPED | OUTPATIENT
Start: 2025-02-14

## 2025-02-14 RX ORDER — HYDROCHLOROTHIAZIDE 12.5 MG/1
12.5 TABLET ORAL DAILY
Qty: 90 TABLET | Refills: 2 | Status: SHIPPED | OUTPATIENT
Start: 2025-02-14

## 2025-02-14 NOTE — PROGRESS NOTES
Subjective        Chief Complaint   Patient presents with    Hypertension           Chacorta Langston is a 79 y.o. male who presents for    Patient Active Problem List   Diagnosis    DDD (degenerative disc disease), lumbosacral    Diverticulosis    Primary hypertension    Frequent PVCs    Nonrheumatic aortic valve insufficiency    High cholesterol       History of Present Illness   He is drinking about 80 ounces of water daily. His BP was 137/70s last night. His SBP has been up to 150. Denies chest pain or dyspnea.      Allergies   Allergen Reactions    Clindamycin/Lincomycin Rash    Ace Inhibitors Other (See Comments)     Renal insufficiency    Beta Adrenergic Blockers Unknown (See Comments)     'COLD FEET'    Penicillins Rash       Current Outpatient Medications on File Prior to Visit   Medication Sig Dispense Refill    ascorbic acid (VITAMIN C) 500 MG tablet Take 1 tablet by mouth Daily.      BIOTIN PO Take  by mouth.      calcium carbonate-cholecalciferol 500-400 MG-UNIT tablet tablet Take  by mouth Daily.      saw palmetto 160 MG capsule Take 450 mg by mouth 4 (Four) Times a Day.      [DISCONTINUED] amLODIPine (NORVASC) 10 MG tablet TAKE 1 TABLET BY MOUTH DAILY 90 tablet 2    [DISCONTINUED] hydroCHLOROthiazide 12.5 MG tablet TAKE 1 TABLET BY MOUTH DAILY 90 tablet 2     No current facility-administered medications on file prior to visit.       Past Medical History:   Diagnosis Date    Acute gastric ulcer with hemorrhage 12/11/2018    Basal cell cancer 03/25/2016    Clostridium difficile infection 10/08/2019    COVID-19 05/2022    Iron deficiency anemia 10/08/2019    Mild renal insufficiency 03/05/2021    Schatzki's ring of distal esophagus 12/11/2018       Past Surgical History:   Procedure Laterality Date    COLONOSCOPY N/A 12/07/2018    Procedure: COLONOSCOPY to cecum and terminal ileum;  Surgeon: Maxx Tavarez MD;  Location: [facility] ENDOSCOPY;  Service: Gastroenterology    CYST REMOVAL      ENDOSCOPY N/A  "12/07/2018    Procedure: ESOPHAGOGASTRODUODENOSCOPY with biopsies, resolution clip X 1 placed;  Surgeon: Maxx Tavarez MD;  Location: [facility] ENDOSCOPY;  Service: Gastroenterology    ENDOSCOPY N/A 03/08/2019    Procedure: ESOPHAGOGASTRODUODENOSCOPY;  Surgeon: Maxx Tavarez MD;  Location: [facility] ENDOSCOPY;  Service: Gastroenterology       Family History   Problem Relation Age of Onset    COPD Mother     Hypertension Mother     Stroke Mother     Osteoporosis Mother     COPD Father     Pneumonia Father     Asthma Father     Coronary artery disease Brother         Brother with CABG at 79    Diabetes Brother     Other Brother         pulmonary fibrosis       Social History     Socioeconomic History    Marital status:    Tobacco Use    Smoking status: Never     Passive exposure: Never    Smokeless tobacco: Never   Vaping Use    Vaping status: Never Used   Substance and Sexual Activity    Alcohol use: Yes     Comment: 4 drinks per week    Drug use: No    Sexual activity: Yes     Partners: Female     Birth control/protection: None           The following portions of the patient's history were reviewed and updated as appropriate: problem list, allergies, current medications, past medical history, past family history, past social history, and past surgical history.    Review of Systems    Immunization History   Administered Date(s) Administered    COVID-19 (PATRICE) 04/10/2021, 11/17/2021    TD Preservative Free (Tenivac) 03/19/2019       Objective   Vitals:    02/14/25 1120   BP: 132/80   Weight: 81 kg (178 lb 9.6 oz)   Height: 182.9 cm (72.01\")     Body mass index is 24.22 kg/m².  Physical Exam  Vitals reviewed.   Constitutional:       Appearance: He is well-developed.   HENT:      Head: Normocephalic and atraumatic.   Cardiovascular:      Rate and Rhythm: Normal rate and regular rhythm.      Heart sounds: Normal heart sounds, S1 normal and S2 normal.   Pulmonary:      Effort: Pulmonary effort is normal.      " Breath sounds: Normal breath sounds.   Skin:     General: Skin is warm.   Neurological:      Mental Status: He is alert.   Psychiatric:         Behavior: Behavior normal.         Procedures    Assessment & Plan   Diagnoses and all orders for this visit:    1. Primary hypertension (Primary)  -     Comprehensive Metabolic Panel; Future  -     Lipid Panel With / Chol / HDL Ratio; Future  -     hydroCHLOROthiazide 12.5 MG tablet; Take 1 tablet by mouth Daily.  Dispense: 90 tablet; Refill: 2  -     amLODIPine (NORVASC) 10 MG tablet; Take 1 tablet by mouth Daily.  Dispense: 90 tablet; Refill: 2               BMP noted. BP is actually pretty good today. No changes.    Return in about 5 months (around 7/14/2025) for Medicare Wellness, Lab Before FUP.

## 2025-08-19 ENCOUNTER — LAB (OUTPATIENT)
Dept: LAB | Facility: HOSPITAL | Age: 80
End: 2025-08-19
Payer: MEDICARE

## 2025-08-19 PROCEDURE — 80061 LIPID PANEL: CPT | Performed by: INTERNAL MEDICINE

## 2025-08-19 PROCEDURE — 80053 COMPREHEN METABOLIC PANEL: CPT | Performed by: INTERNAL MEDICINE

## 2025-08-22 ENCOUNTER — OFFICE VISIT (OUTPATIENT)
Dept: INTERNAL MEDICINE | Facility: CLINIC | Age: 80
End: 2025-08-22
Payer: MEDICARE

## 2025-08-22 VITALS
BODY MASS INDEX: 23.07 KG/M2 | SYSTOLIC BLOOD PRESSURE: 126 MMHG | WEIGHT: 170.3 LBS | DIASTOLIC BLOOD PRESSURE: 82 MMHG | HEIGHT: 72 IN

## 2025-08-22 DIAGNOSIS — Z00.00 ENCOUNTER FOR SUBSEQUENT ANNUAL WELLNESS VISIT (AWV) IN MEDICARE PATIENT: Primary | ICD-10-CM

## 2025-08-22 DIAGNOSIS — E78.00 HIGH CHOLESTEROL: Chronic | ICD-10-CM

## 2025-08-22 DIAGNOSIS — I49.3 FREQUENT PVCS: ICD-10-CM

## 2025-08-22 DIAGNOSIS — I10 PRIMARY HYPERTENSION: Chronic | ICD-10-CM

## 2025-08-22 RX ORDER — HYDROCHLOROTHIAZIDE 12.5 MG/1
12.5 TABLET ORAL DAILY
Qty: 90 TABLET | Refills: 2 | Status: SHIPPED | OUTPATIENT
Start: 2025-08-22

## 2025-08-22 RX ORDER — AMLODIPINE BESYLATE 10 MG/1
10 TABLET ORAL DAILY
Qty: 90 TABLET | Refills: 2 | Status: SHIPPED | OUTPATIENT
Start: 2025-08-22

## (undated) DEVICE — TUBING, SUCTION, 1/4" X 10', STRAIGHT: Brand: MEDLINE

## (undated) DEVICE — BITEBLOCK OMNI BLOC

## (undated) DEVICE — Device: Brand: DEFENDO AIR/WATER/SUCTION AND BIOPSY VALVE

## (undated) DEVICE — FRCP BX RADJAW4 NDL 2.8 240CM LG OG BX40

## (undated) DEVICE — THE TORRENT IRRIGATION SCOPE CONNECTOR IS USED WITH THE TORRENT IRRIGATION TUBING TO PROVIDE IRRIGATION FLUIDS SUCH AS STERILE WATER DURING GASTROINTESTINAL ENDOSCOPIC PROCEDURES WHEN USED IN CONJUNCTION WITH AN IRRIGATION PUMP (OR ELECTROSURGICAL UNIT).: Brand: TORRENT

## (undated) DEVICE — CANNULA,ADULT,SOFT-TOUCH,7'TUBE,UC: Brand: PENDING

## (undated) DEVICE — CANN NASL CO2 TRULINK W/O2 A/